# Patient Record
Sex: FEMALE | Race: WHITE | NOT HISPANIC OR LATINO | Employment: PART TIME | URBAN - METROPOLITAN AREA
[De-identification: names, ages, dates, MRNs, and addresses within clinical notes are randomized per-mention and may not be internally consistent; named-entity substitution may affect disease eponyms.]

---

## 2018-12-11 ENCOUNTER — HOSPITAL ENCOUNTER (EMERGENCY)
Facility: MEDICAL CENTER | Age: 63
End: 2018-12-11
Attending: EMERGENCY MEDICINE
Payer: COMMERCIAL

## 2018-12-11 ENCOUNTER — APPOINTMENT (OUTPATIENT)
Dept: RADIOLOGY | Facility: MEDICAL CENTER | Age: 63
End: 2018-12-11
Attending: EMERGENCY MEDICINE
Payer: COMMERCIAL

## 2018-12-11 VITALS
RESPIRATION RATE: 16 BRPM | DIASTOLIC BLOOD PRESSURE: 102 MMHG | BODY MASS INDEX: 28.12 KG/M2 | WEIGHT: 175 LBS | OXYGEN SATURATION: 98 % | HEIGHT: 66 IN | TEMPERATURE: 98.2 F | SYSTOLIC BLOOD PRESSURE: 169 MMHG | HEART RATE: 72 BPM

## 2018-12-11 DIAGNOSIS — S22.080A COMPRESSION FRACTURE OF T12 VERTEBRA (HCC): ICD-10-CM

## 2018-12-11 DIAGNOSIS — M54.6 ACUTE LEFT-SIDED THORACIC BACK PAIN: ICD-10-CM

## 2018-12-11 LAB
ALBUMIN SERPL BCP-MCNC: 4.3 G/DL (ref 3.2–4.9)
ALBUMIN/GLOB SERPL: 1.5 G/DL
ALP SERPL-CCNC: 87 U/L (ref 30–99)
ALT SERPL-CCNC: 17 U/L (ref 2–50)
ANION GAP SERPL CALC-SCNC: 10 MMOL/L (ref 0–11.9)
APPEARANCE UR: CLEAR
AST SERPL-CCNC: 18 U/L (ref 12–45)
BACTERIA #/AREA URNS HPF: ABNORMAL /HPF
BASOPHILS # BLD AUTO: 0.6 % (ref 0–1.8)
BASOPHILS # BLD: 0.05 K/UL (ref 0–0.12)
BILIRUB SERPL-MCNC: 0.6 MG/DL (ref 0.1–1.5)
BILIRUB UR QL STRIP.AUTO: NEGATIVE
BUN SERPL-MCNC: 13 MG/DL (ref 8–22)
CALCIUM SERPL-MCNC: 10.1 MG/DL (ref 8.5–10.5)
CHLORIDE SERPL-SCNC: 103 MMOL/L (ref 96–112)
CO2 SERPL-SCNC: 24 MMOL/L (ref 20–33)
COLOR UR: YELLOW
CREAT SERPL-MCNC: 0.52 MG/DL (ref 0.5–1.4)
EKG IMPRESSION: NORMAL
EOSINOPHIL # BLD AUTO: 0.07 K/UL (ref 0–0.51)
EOSINOPHIL NFR BLD: 0.8 % (ref 0–6.9)
EPI CELLS #/AREA URNS HPF: ABNORMAL /HPF
ERYTHROCYTE [DISTWIDTH] IN BLOOD BY AUTOMATED COUNT: 39.3 FL (ref 35.9–50)
GLOBULIN SER CALC-MCNC: 2.8 G/DL (ref 1.9–3.5)
GLUCOSE SERPL-MCNC: 87 MG/DL (ref 65–99)
GLUCOSE UR STRIP.AUTO-MCNC: NEGATIVE MG/DL
HCT VFR BLD AUTO: 45.9 % (ref 37–47)
HGB BLD-MCNC: 15.9 G/DL (ref 12–16)
HYALINE CASTS #/AREA URNS LPF: ABNORMAL /LPF
IMM GRANULOCYTES # BLD AUTO: 0.01 K/UL (ref 0–0.11)
IMM GRANULOCYTES NFR BLD AUTO: 0.1 % (ref 0–0.9)
KETONES UR STRIP.AUTO-MCNC: ABNORMAL MG/DL
LEUKOCYTE ESTERASE UR QL STRIP.AUTO: ABNORMAL
LIPASE SERPL-CCNC: 17 U/L (ref 11–82)
LYMPHOCYTES # BLD AUTO: 3.67 K/UL (ref 1–4.8)
LYMPHOCYTES NFR BLD: 44.5 % (ref 22–41)
MCH RBC QN AUTO: 31.3 PG (ref 27–33)
MCHC RBC AUTO-ENTMCNC: 34.6 G/DL (ref 33.6–35)
MCV RBC AUTO: 90.4 FL (ref 81.4–97.8)
MICRO URNS: ABNORMAL
MONOCYTES # BLD AUTO: 0.65 K/UL (ref 0–0.85)
MONOCYTES NFR BLD AUTO: 7.9 % (ref 0–13.4)
NEUTROPHILS # BLD AUTO: 3.8 K/UL (ref 2–7.15)
NEUTROPHILS NFR BLD: 46.1 % (ref 44–72)
NITRITE UR QL STRIP.AUTO: NEGATIVE
NRBC # BLD AUTO: 0 K/UL
NRBC BLD-RTO: 0 /100 WBC
PH UR STRIP.AUTO: 6 [PH]
PLATELET # BLD AUTO: 267 K/UL (ref 164–446)
PMV BLD AUTO: 9.8 FL (ref 9–12.9)
POTASSIUM SERPL-SCNC: 3.9 MMOL/L (ref 3.6–5.5)
PROT SERPL-MCNC: 7.1 G/DL (ref 6–8.2)
PROT UR QL STRIP: NEGATIVE MG/DL
RBC # BLD AUTO: 5.08 M/UL (ref 4.2–5.4)
RBC # URNS HPF: ABNORMAL /HPF
RBC UR QL AUTO: ABNORMAL
SODIUM SERPL-SCNC: 137 MMOL/L (ref 135–145)
SP GR UR STRIP.AUTO: 1.02
TROPONIN I SERPL-MCNC: <0.01 NG/ML (ref 0–0.04)
UROBILINOGEN UR STRIP.AUTO-MCNC: 0.2 MG/DL
WBC # BLD AUTO: 8.3 K/UL (ref 4.8–10.8)
WBC #/AREA URNS HPF: ABNORMAL /HPF

## 2018-12-11 PROCEDURE — 700111 HCHG RX REV CODE 636 W/ 250 OVERRIDE (IP): Performed by: EMERGENCY MEDICINE

## 2018-12-11 PROCEDURE — 84484 ASSAY OF TROPONIN QUANT: CPT

## 2018-12-11 PROCEDURE — 700102 HCHG RX REV CODE 250 W/ 637 OVERRIDE(OP): Performed by: EMERGENCY MEDICINE

## 2018-12-11 PROCEDURE — 72072 X-RAY EXAM THORAC SPINE 3VWS: CPT

## 2018-12-11 PROCEDURE — 80053 COMPREHEN METABOLIC PANEL: CPT

## 2018-12-11 PROCEDURE — 81001 URINALYSIS AUTO W/SCOPE: CPT

## 2018-12-11 PROCEDURE — 87086 URINE CULTURE/COLONY COUNT: CPT

## 2018-12-11 PROCEDURE — 94760 N-INVAS EAR/PLS OXIMETRY 1: CPT

## 2018-12-11 PROCEDURE — 71045 X-RAY EXAM CHEST 1 VIEW: CPT

## 2018-12-11 PROCEDURE — A9270 NON-COVERED ITEM OR SERVICE: HCPCS | Performed by: EMERGENCY MEDICINE

## 2018-12-11 PROCEDURE — 96375 TX/PRO/DX INJ NEW DRUG ADDON: CPT

## 2018-12-11 PROCEDURE — 99285 EMERGENCY DEPT VISIT HI MDM: CPT

## 2018-12-11 PROCEDURE — 93005 ELECTROCARDIOGRAM TRACING: CPT

## 2018-12-11 PROCEDURE — 96374 THER/PROPH/DIAG INJ IV PUSH: CPT

## 2018-12-11 PROCEDURE — 85025 COMPLETE CBC W/AUTO DIFF WBC: CPT

## 2018-12-11 PROCEDURE — 83690 ASSAY OF LIPASE: CPT

## 2018-12-11 RX ORDER — CYCLOBENZAPRINE HCL 10 MG
10 TABLET ORAL ONCE
Status: COMPLETED | OUTPATIENT
Start: 2018-12-11 | End: 2018-12-11

## 2018-12-11 RX ORDER — HYDROCODONE BITARTRATE AND ACETAMINOPHEN 10; 325 MG/1; MG/1
1 TABLET ORAL ONCE
Status: COMPLETED | OUTPATIENT
Start: 2018-12-11 | End: 2018-12-11

## 2018-12-11 RX ORDER — CYCLOBENZAPRINE HCL 10 MG
10 TABLET ORAL 3 TIMES DAILY PRN
Qty: 30 TAB | Refills: 0 | Status: SHIPPED | OUTPATIENT
Start: 2018-12-11 | End: 2024-01-12

## 2018-12-11 RX ORDER — ALPRAZOLAM 0.5 MG/1
0.5 TABLET ORAL 2 TIMES DAILY PRN
COMMUNITY

## 2018-12-11 RX ORDER — LIDOCAINE 50 MG/G
1 PATCH TOPICAL EVERY 24 HOURS
Qty: 10 PATCH | Refills: 0 | Status: SHIPPED | OUTPATIENT
Start: 2018-12-11 | End: 2024-01-12

## 2018-12-11 RX ORDER — KETOROLAC TROMETHAMINE 30 MG/ML
30 INJECTION, SOLUTION INTRAMUSCULAR; INTRAVENOUS ONCE
Status: COMPLETED | OUTPATIENT
Start: 2018-12-11 | End: 2018-12-11

## 2018-12-11 RX ADMIN — KETOROLAC TROMETHAMINE 30 MG: 30 INJECTION, SOLUTION INTRAMUSCULAR at 17:16

## 2018-12-11 RX ADMIN — LIDOCAINE HYDROCHLORIDE 30 ML: 20 SOLUTION OROPHARYNGEAL at 15:21

## 2018-12-11 RX ADMIN — HYDROCODONE BITARTRATE AND ACETAMINOPHEN 1 TABLET: 10; 325 TABLET ORAL at 17:16

## 2018-12-11 RX ADMIN — CYCLOBENZAPRINE 10 MG: 10 TABLET, FILM COATED ORAL at 17:16

## 2018-12-11 ASSESSMENT — PAIN SCALES - GENERAL
PAINLEVEL_OUTOF10: 10
PAINLEVEL_OUTOF10: 10

## 2018-12-11 NOTE — ED NOTES
"Pt states increased urinary urgency since seeing chiropractor, \"can't get to the bathroom quick enough\".   "

## 2018-12-11 NOTE — ED NOTES
"Pt with sudden on set of \"acid\" pain to (L) lower chest, under breast area. ERP at bedside,  PIV placed, labs drawn and sent.  EKG done,  Medicated per MAR with some relief.  Pt to be moved to [facility] when clean,  Pt updated on POC, denies other needs at this time.   "

## 2018-12-11 NOTE — ED PROVIDER NOTES
ED Provider Note    Scribed for Del Cole M.D. by Raheem Peacock. 12/11/2018  3:59 PM    Primary care provider: Yoel Mosher M.D.  Means of arrival: Wheel chair  History obtained from: Patient   History limited by: None     CHIEF COMPLAINT  Chief Complaint   Patient presents with   • Fall     reports slipping on ice while getting out of her jeep 2 weeks ago. diffuse back and LT sided rib pain ever since.    • Back Pain   • Rib Pain       HPI  Windy León is a 63 y.o. female who presents to the Emergency Department with back pain and rib pain secondary to a fall about 2 weeks ago. The patient states about two weeks ago she was trying to get out of her Jeep and the railing was icy and she slipped and slid out of her car. She states since then she has had back pain in her mid back down to her lower back. Patient has a long standing history of back pain and states that this fall worsened her pain. She states the pain feels the same as what is normally does but just much worse since the fall. She states she has tried going to her physical therapist and chiropractor for 2 weeks and was told she had some vertebrate and ribs out of place for this and it has not seemed to help. She also states she has been having some lower extremity weakness and is not able to walk without a bond or walker. Pt has been taking Ibuprofen and hydrocodone for her pain. She also notes a resolved episode of chest pain and states she has a history of heart burn which she takes omeprazole for and she states she gets about one episode daily. She gets these episodes daily and the patient also states she is a chronic smoker. Additionally she notes she has not had a bowel movement in a week but this is normal as she has a long history of constipation. She denies any numbness, tingling, loss of bowel or bladder, nausea, vomiting, fever, chills at this time.     REVIEW OF SYSTEMS  Pertinent positives include acute on chronic back pain and  "rib pain after fall, heart burn, weakness, constipation. Pertinent negatives include no numbness, tingling, loss of bowel or bladder, nausea, vomiting, fever, chills.  All other systems reviewed and negative.    PAST MEDICAL HISTORY   has a past medical history of Arthritis; Heart burn; and Psychiatric problem.    SURGICAL HISTORY   has a past surgical history that includes other cardiac surgery (2015); other orthopedic surgery; and hip arth anterior total (Left, 5/19/2016).    SOCIAL HISTORY  Social History   Substance Use Topics   • Smoking status: Current Every Day Smoker     Packs/day: 0.50     Types: Cigarettes   • Smokeless tobacco: Never Used   • Alcohol use No      History   Drug Use No       FAMILY HISTORY  None pertinent     CURRENT MEDICATIONS  Home Medications     Reviewed by Adrian Holt R.N. (Registered Nurse) on 12/11/18 at 1423  Med List Status: Partial   Medication Last Dose Status   ALPRAZolam (XANAX) 0.5 MG Tab 12/10/2018 Active   CALCIUM PO  Active   fluoxetine (PROZAC) 40 MG capsule 12/10/2018 Active   hydrocodone/acetaminophen (NORCO)  MG Tab 12/11/2018 Active   ibuprofen (MOTRIN) 600 MG Tab 12/11/2018 Active   MAGNESIUM PO  Active   omeprazole (PRILOSEC) 20 MG delayed-release capsule 12/10/2018 Active   therapeutic multivitamin-minerals (THERAGRAN-M) Tab 12/10/2018 Active                ALLERGIES  Allergies   Allergen Reactions   • Sulfa Drugs Itching       PHYSICAL EXAM  VITAL SIGNS: /85   Pulse 72   Temp 36.6 °C (97.9 °F) (Temporal)   Resp 14   Ht 1.676 m (5' 6\")   Wt 79.4 kg (175 lb)   SpO2 97%   BMI 28.25 kg/m²     Constitutional: Well developed, Well nourished, Moderate distress, Non-toxic appearance.   HENT: Normocephalic, Atraumatic, Bilateral external ears normal, Oropharynx moist, No oral exudates.   Eyes: PERRLA, EOMI, Conjunctiva normal, No discharge.   Neck: No tenderness, Supple, No stridor.   Lymphatic: No lymphadenopathy noted.   Cardiovascular: Normal heart " rate, Normal rhythm.   Thorax & Lungs: Chest wall tenderness. Clear to auscultation bilaterally, No respiratory distress, No wheezing, No crackles.   Back:Tenderness to palpation of the left lower thoracic paraspinal area. No midline thoracic or lumbar tenderness.   Abdomen: Soft, No tenderness, No masses, No pulsatile masses.   Skin: Warm, Dry, No erythema, No rash.   Extremities:, No edema No cyanosis.   Musculoskeletal: No tenderness to palpation or major deformities noted.  Intact distal pulses  Neurologic: Awake, alert. Moves all extremities spontaneously. 5/5 strength and sensation in bilateral upper and lower extremities.   Psychiatric: Affect normal, Judgment normal, Mood normal.     LABS  Results for orders placed or performed during the hospital encounter of 12/11/18   CBC WITH DIFFERENTIAL   Result Value Ref Range    WBC 8.3 4.8 - 10.8 K/uL    RBC 5.08 4.20 - 5.40 M/uL    Hemoglobin 15.9 12.0 - 16.0 g/dL    Hematocrit 45.9 37.0 - 47.0 %    MCV 90.4 81.4 - 97.8 fL    MCH 31.3 27.0 - 33.0 pg    MCHC 34.6 33.6 - 35.0 g/dL    RDW 39.3 35.9 - 50.0 fL    Platelet Count 267 164 - 446 K/uL    MPV 9.8 9.0 - 12.9 fL    Neutrophils-Polys 46.10 44.00 - 72.00 %    Lymphocytes 44.50 (H) 22.00 - 41.00 %    Monocytes 7.90 0.00 - 13.40 %    Eosinophils 0.80 0.00 - 6.90 %    Basophils 0.60 0.00 - 1.80 %    Immature Granulocytes 0.10 0.00 - 0.90 %    Nucleated RBC 0.00 /100 WBC    Neutrophils (Absolute) 3.80 2.00 - 7.15 K/uL    Lymphs (Absolute) 3.67 1.00 - 4.80 K/uL    Monos (Absolute) 0.65 0.00 - 0.85 K/uL    Eos (Absolute) 0.07 0.00 - 0.51 K/uL    Baso (Absolute) 0.05 0.00 - 0.12 K/uL    Immature Granulocytes (abs) 0.01 0.00 - 0.11 K/uL    NRBC (Absolute) 0.00 K/uL   COMP METABOLIC PANEL   Result Value Ref Range    Sodium 137 135 - 145 mmol/L    Potassium 3.9 3.6 - 5.5 mmol/L    Chloride 103 96 - 112 mmol/L    Co2 24 20 - 33 mmol/L    Anion Gap 10.0 0.0 - 11.9    Glucose 87 65 - 99 mg/dL    Bun 13 8 - 22 mg/dL     Creatinine 0.52 0.50 - 1.40 mg/dL    Calcium 10.1 8.5 - 10.5 mg/dL    AST(SGOT) 18 12 - 45 U/L    ALT(SGPT) 17 2 - 50 U/L    Alkaline Phosphatase 87 30 - 99 U/L    Total Bilirubin 0.6 0.1 - 1.5 mg/dL    Albumin 4.3 3.2 - 4.9 g/dL    Total Protein 7.1 6.0 - 8.2 g/dL    Globulin 2.8 1.9 - 3.5 g/dL    A-G Ratio 1.5 g/dL   LIPASE   Result Value Ref Range    Lipase 17 11 - 82 U/L   TROPONIN   Result Value Ref Range    Troponin I <0.01 0.00 - 0.04 ng/mL   URINALYSIS CULTURE, IF INDICATED   Result Value Ref Range    Color Yellow     Character Clear     Specific Gravity 1.018 <1.035    Ph 6.0 5.0 - 8.0    Glucose Negative Negative mg/dL    Ketones Trace (A) Negative mg/dL    Protein Negative Negative mg/dL    Bilirubin Negative Negative    Urobilinogen, Urine 0.2 Negative    Nitrite Negative Negative    Leukocyte Esterase Large (A) Negative    Occult Blood Small (A) Negative    Micro Urine Req Microscopic    ESTIMATED GFR   Result Value Ref Range    GFR If African American >60 >60 mL/min/1.73 m 2    GFR If Non African American >60 >60 mL/min/1.73 m 2   URINE MICROSCOPIC (W/UA)   Result Value Ref Range    WBC 10-20 (A) /hpf    RBC 10-20 (A) /hpf    Bacteria Few (A) None /hpf    Epithelial Cells Few /hpf    Hyaline Cast 3-5 (A) /lpf   EKG   Result Value Ref Range    Report       [facility] Emergency Dept.    Test Date:  2018  Pt Name:    LETY HERNANDEZ                  Department: ER  MRN:        [id number]                      Room:       [facility]  Gender:     Female                       Technician: [id number]  :        1955                   Requested By:ER TRIAGE PROTOCOL  Order #:    [id number]                    Reading MD: GILMA THOMPSON MD    Measurements  Intervals                                Axis  Rate:       64                           P:          80  AZ:         156                          QRS:        50  QRSD:       74                           T:          81  QT:         428  QTc:         442    Interpretive Statements  SINUS RHYTHM  PROBABLE LEFT ATRIAL ABNORMALITY  Compared to ECG 05/16/2016 11:57:42  No significant changes    Electronically Signed On 12- 15:48:24 PST by GILMA THOMPSON MD       All labs reviewed by me.    EKG  Interpreted by me as above    RADIOLOGY  DX-THORACIC SPINE-WITH SWIMMERS VIEW   Final Result      1.  Moderate superior endplate compression fracture at the T12 level of the thoracic spine is noted which could be due to insufficiency fracture or compression fracture of undetermined age.      2.  There is degenerative disc disease and dextroscoliosis.      DX-CHEST-PORTABLE (1 VIEW)   Final Result      No acute cardiopulmonary disease.        The radiologist's interpretation of all radiological studies have been reviewed by me.      COURSE & MEDICAL DECISION MAKING  Pertinent Labs & Imaging studies reviewed. (See chart for details)    3:59 PM - Patient seen and examined at bedside. Patient presents with acute on chronic back pain staring about two weeks ago after a fall as well as chronic heart burn and constipation. Patient will be treated with GI cocktail. Ordered chest x-ray, CBC, CMP, Lipase, Troponin, UA, and EKG to evaluate her symptoms. The differential diagnoses include but are not limited to: aacute on chronic low back pain, ACS, esophageal spasm. She was agreeable with her plan of care at this time.     Decision Making:  Patient with midthoracic back pain more pronounced on the left side, x-ray shows a compression fracture.  The patient has no improvement with Flexeril and Norco, offered to admit the patient to the hospital for symptomatically relief for possible assessment for kyphoplasty.  The patient does not want to be admitted to the hospital, will give the patient a prescription for Flexeril, lidocaine patches, have the patient follow-up with a primary care physician, have the patient return with worsening symptoms.     The patient will return  for new or worsening symptoms and is stable at the time of discharge.    The patient is referred to a primary physician for blood pressure management, diabetic screening, and for all other preventative health concerns.        DISPOSITION:  Patient will be discharged home in stable condition.    FOLLOW UP:  [facility], Emergency Dept  [address] [address] [phone]    If symptoms worsen    [facility]  [address] [phone]          OUTPATIENT MEDICATIONS:  Discharge Medication List as of 12/11/2018  6:29 PM      START taking these medications    Details   cyclobenzaprine (FLEXERIL) 10 MG Tab Take 1 Tab by mouth 3 times a day as needed., Disp-30 Tab, R-0, Normal      lidocaine (LIDODERM) 5 % Patch Apply 1 Patch to skin as directed every 24 hours., Disp-10 Patch, R-0, Normal      Diclofenac Sodium 1 % Gel Apply 2 g to skin as directed 3 times a day as needed., Disp-100 g, R-0, Normal               FINAL IMPRESSION  1. Acute left-sided thoracic back pain    2. Compression fracture of T12 vertebra (HCC)          Raheem BELTRE (Scribe), am scribing for, and in the presence of, Del Cole M.D..    Electronically signed by: Raheem Peacock (Riz[place]ana), 12/11/2018    IDel M.D. personally performed the services described in this documentation, as scribed by Raheem Peacock in my presence, and it is both accurate and complete. C.     The note accurately reflects work and decisions made by me.  Del Cole  12/11/2018  7:41 PM

## 2018-12-11 NOTE — ED TRIAGE NOTES
"Chief Complaint   Patient presents with   • Fall     reports slipping on ice while getting out of her jeep 2 weeks ago. diffuse back and LT sided rib pain ever since.    • Back Pain   • Rib Pain     Pt to triage via w/c for above. Reports going to a chiropractor and getting adjusted last Tues, Wed, Thurs (2 times a day). Reports he did an xray but couldn't read it.    Pt returned to [facility]. Educated on triage process and to inform staff of any changes.     /85   Pulse 72   Temp 36.6 °C (97.9 °F) (Temporal)   Resp 14   Ht 1.676 m (5' 6\")   Wt 79.4 kg (175 lb)   SpO2 97%   BMI 28.25 kg/m²     "

## 2018-12-13 LAB
BACTERIA UR CULT: NORMAL
SIGNIFICANT IND 70042: NORMAL
SITE SITE: NORMAL
SOURCE SOURCE: NORMAL

## 2021-02-02 ENCOUNTER — HOSPITAL ENCOUNTER (OUTPATIENT)
Dept: HOSPITAL 8 - CFH | Age: 66
Discharge: HOME | End: 2021-02-02
Attending: PHYSICIAN ASSISTANT
Payer: MEDICARE
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
## 2021-02-02 DIAGNOSIS — S22.008A: Primary | ICD-10-CM
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
## 2021-02-02 DIAGNOSIS — M81.0: ICD-10-CM

## 2021-02-02 PROCEDURE — 77080 DXA BONE DENSITY AXIAL: CPT

## 2021-03-03 DIAGNOSIS — Z23 NEED FOR VACCINATION: ICD-10-CM

## 2024-01-12 ENCOUNTER — APPOINTMENT (OUTPATIENT)
Dept: RADIOLOGY | Facility: MEDICAL CENTER | Age: 69
DRG: 481 | End: 2024-01-12
Attending: EMERGENCY MEDICINE
Payer: COMMERCIAL

## 2024-01-12 ENCOUNTER — ANESTHESIA (OUTPATIENT)
Dept: SURGERY | Facility: MEDICAL CENTER | Age: 69
DRG: 481 | End: 2024-01-12
Payer: COMMERCIAL

## 2024-01-12 ENCOUNTER — ANESTHESIA EVENT (OUTPATIENT)
Dept: SURGERY | Facility: MEDICAL CENTER | Age: 69
DRG: 481 | End: 2024-01-12
Payer: COMMERCIAL

## 2024-01-12 ENCOUNTER — APPOINTMENT (OUTPATIENT)
Dept: RADIOLOGY | Facility: MEDICAL CENTER | Age: 69
DRG: 481 | End: 2024-01-12
Attending: NEUROLOGICAL SURGERY
Payer: COMMERCIAL

## 2024-01-12 ENCOUNTER — APPOINTMENT (OUTPATIENT)
Dept: RADIOLOGY | Facility: MEDICAL CENTER | Age: 69
DRG: 481 | End: 2024-01-12
Attending: ORTHOPAEDIC SURGERY
Payer: COMMERCIAL

## 2024-01-12 ENCOUNTER — HOSPITAL ENCOUNTER (INPATIENT)
Facility: MEDICAL CENTER | Age: 69
LOS: 7 days | DRG: 481 | End: 2024-01-19
Attending: EMERGENCY MEDICINE | Admitting: HOSPITALIST
Payer: COMMERCIAL

## 2024-01-12 DIAGNOSIS — T14.8XXA FRACTURE: ICD-10-CM

## 2024-01-12 DIAGNOSIS — S72.422A CLOSED BICONDYLAR FRACTURE OF LEFT FEMUR, INITIAL ENCOUNTER (HCC): ICD-10-CM

## 2024-01-12 DIAGNOSIS — F17.200 TOBACCO DEPENDENCE: ICD-10-CM

## 2024-01-12 DIAGNOSIS — E87.6 HYPOKALEMIA: ICD-10-CM

## 2024-01-12 DIAGNOSIS — I16.0 HYPERTENSIVE URGENCY: ICD-10-CM

## 2024-01-12 DIAGNOSIS — S72.432A CLOSED BICONDYLAR FRACTURE OF LEFT FEMUR, INITIAL ENCOUNTER (HCC): ICD-10-CM

## 2024-01-12 PROBLEM — E86.0 DEHYDRATION: Status: ACTIVE | Noted: 2024-01-12

## 2024-01-12 PROBLEM — K21.9 GASTROESOPHAGEAL REFLUX DISEASE: Status: ACTIVE | Noted: 2024-01-12

## 2024-01-12 PROBLEM — F41.9 ANXIETY: Status: ACTIVE | Noted: 2024-01-12

## 2024-01-12 PROBLEM — E87.1 HYPONATREMIA: Status: ACTIVE | Noted: 2024-01-12

## 2024-01-12 LAB
ALBUMIN SERPL BCP-MCNC: 3.7 G/DL (ref 3.2–4.9)
ALBUMIN/GLOB SERPL: 1.5 G/DL
ALP SERPL-CCNC: 98 U/L (ref 30–99)
ALT SERPL-CCNC: 18 U/L (ref 2–50)
ANION GAP SERPL CALC-SCNC: 8 MMOL/L (ref 7–16)
AST SERPL-CCNC: 21 U/L (ref 12–45)
BASOPHILS # BLD AUTO: 0.7 % (ref 0–1.8)
BASOPHILS # BLD: 0.06 K/UL (ref 0–0.12)
BILIRUB SERPL-MCNC: <0.2 MG/DL (ref 0.1–1.5)
BUN SERPL-MCNC: 15 MG/DL (ref 8–22)
CALCIUM ALBUM COR SERPL-MCNC: 9.3 MG/DL (ref 8.5–10.5)
CALCIUM SERPL-MCNC: 9.1 MG/DL (ref 8.5–10.5)
CHLORIDE SERPL-SCNC: 99 MMOL/L (ref 96–112)
CO2 SERPL-SCNC: 27 MMOL/L (ref 20–33)
CREAT SERPL-MCNC: 0.65 MG/DL (ref 0.5–1.4)
EKG IMPRESSION: NORMAL
EOSINOPHIL # BLD AUTO: 0.07 K/UL (ref 0–0.51)
EOSINOPHIL NFR BLD: 0.8 % (ref 0–6.9)
ERYTHROCYTE [DISTWIDTH] IN BLOOD BY AUTOMATED COUNT: 43 FL (ref 35.9–50)
GFR SERPLBLD CREATININE-BSD FMLA CKD-EPI: 95 ML/MIN/1.73 M 2
GLOBULIN SER CALC-MCNC: 2.5 G/DL (ref 1.9–3.5)
GLUCOSE SERPL-MCNC: 100 MG/DL (ref 65–99)
HCT VFR BLD AUTO: 45.5 % (ref 37–47)
HGB BLD-MCNC: 15 G/DL (ref 12–16)
IMM GRANULOCYTES # BLD AUTO: 0.03 K/UL (ref 0–0.11)
IMM GRANULOCYTES NFR BLD AUTO: 0.3 % (ref 0–0.9)
LYMPHOCYTES # BLD AUTO: 3.58 K/UL (ref 1–4.8)
LYMPHOCYTES NFR BLD: 39.3 % (ref 22–41)
MCH RBC QN AUTO: 31.3 PG (ref 27–33)
MCHC RBC AUTO-ENTMCNC: 33 G/DL (ref 32.2–35.5)
MCV RBC AUTO: 95 FL (ref 81.4–97.8)
MONOCYTES # BLD AUTO: 0.78 K/UL (ref 0–0.85)
MONOCYTES NFR BLD AUTO: 8.6 % (ref 0–13.4)
NEUTROPHILS # BLD AUTO: 4.59 K/UL (ref 1.82–7.42)
NEUTROPHILS NFR BLD: 50.3 % (ref 44–72)
NRBC # BLD AUTO: 0 K/UL
NRBC BLD-RTO: 0 /100 WBC (ref 0–0.2)
PLATELET # BLD AUTO: 258 K/UL (ref 164–446)
PMV BLD AUTO: 10.2 FL (ref 9–12.9)
POTASSIUM SERPL-SCNC: 4.5 MMOL/L (ref 3.6–5.5)
PROT SERPL-MCNC: 6.2 G/DL (ref 6–8.2)
RBC # BLD AUTO: 4.79 M/UL (ref 4.2–5.4)
SODIUM SERPL-SCNC: 134 MMOL/L (ref 135–145)
WBC # BLD AUTO: 9.1 K/UL (ref 4.8–10.8)

## 2024-01-12 PROCEDURE — 72170 X-RAY EXAM OF PELVIS: CPT

## 2024-01-12 PROCEDURE — 99406 BEHAV CHNG SMOKING 3-10 MIN: CPT

## 2024-01-12 PROCEDURE — C1713 ANCHOR/SCREW BN/BN,TIS/BN: HCPCS | Performed by: ORTHOPAEDIC SURGERY

## 2024-01-12 PROCEDURE — 96374 THER/PROPH/DIAG INJ IV PUSH: CPT

## 2024-01-12 PROCEDURE — 73560 X-RAY EXAM OF KNEE 1 OR 2: CPT | Mod: LT

## 2024-01-12 PROCEDURE — 700111 HCHG RX REV CODE 636 W/ 250 OVERRIDE (IP): Performed by: ANESTHESIOLOGY

## 2024-01-12 PROCEDURE — 160048 HCHG OR STATISTICAL LEVEL 1-5: Performed by: ORTHOPAEDIC SURGERY

## 2024-01-12 PROCEDURE — 700101 HCHG RX REV CODE 250: Performed by: EMERGENCY MEDICINE

## 2024-01-12 PROCEDURE — 160009 HCHG ANES TIME/MIN: Performed by: ORTHOPAEDIC SURGERY

## 2024-01-12 PROCEDURE — 700111 HCHG RX REV CODE 636 W/ 250 OVERRIDE (IP): Performed by: EMERGENCY MEDICINE

## 2024-01-12 PROCEDURE — 700105 HCHG RX REV CODE 258: Performed by: ANESTHESIOLOGY

## 2024-01-12 PROCEDURE — 160035 HCHG PACU - 1ST 60 MINS PHASE I: Performed by: ORTHOPAEDIC SURGERY

## 2024-01-12 PROCEDURE — 700102 HCHG RX REV CODE 250 W/ 637 OVERRIDE(OP): Performed by: HOSPITALIST

## 2024-01-12 PROCEDURE — 502240 HCHG MISC OR SUPPLY RC 0272: Performed by: ORTHOPAEDIC SURGERY

## 2024-01-12 PROCEDURE — 700101 HCHG RX REV CODE 250: Performed by: STUDENT IN AN ORGANIZED HEALTH CARE EDUCATION/TRAINING PROGRAM

## 2024-01-12 PROCEDURE — 700102 HCHG RX REV CODE 250 W/ 637 OVERRIDE(OP): Performed by: ANESTHESIOLOGY

## 2024-01-12 PROCEDURE — 0QSC04Z REPOSITION LEFT LOWER FEMUR WITH INTERNAL FIXATION DEVICE, OPEN APPROACH: ICD-10-PCS | Performed by: ORTHOPAEDIC SURGERY

## 2024-01-12 PROCEDURE — 160039 HCHG SURGERY MINUTES - EA ADDL 1 MIN LEVEL 3: Performed by: ORTHOPAEDIC SURGERY

## 2024-01-12 PROCEDURE — 80053 COMPREHEN METABOLIC PANEL: CPT

## 2024-01-12 PROCEDURE — 36415 COLL VENOUS BLD VENIPUNCTURE: CPT

## 2024-01-12 PROCEDURE — 0QSCXZZ REPOSITION LEFT LOWER FEMUR, EXTERNAL APPROACH: ICD-10-PCS | Performed by: EMERGENCY MEDICINE

## 2024-01-12 PROCEDURE — A9270 NON-COVERED ITEM OR SERVICE: HCPCS | Performed by: HOSPITALIST

## 2024-01-12 PROCEDURE — 96376 TX/PRO/DX INJ SAME DRUG ADON: CPT

## 2024-01-12 PROCEDURE — 73552 X-RAY EXAM OF FEMUR 2/>: CPT | Mod: LT

## 2024-01-12 PROCEDURE — 700111 HCHG RX REV CODE 636 W/ 250 OVERRIDE (IP): Performed by: STUDENT IN AN ORGANIZED HEALTH CARE EDUCATION/TRAINING PROGRAM

## 2024-01-12 PROCEDURE — 502000 HCHG MISC OR IMPLANTS RC 0278: Performed by: ORTHOPAEDIC SURGERY

## 2024-01-12 PROCEDURE — 85025 COMPLETE CBC W/AUTO DIFF WBC: CPT

## 2024-01-12 PROCEDURE — 94799 UNLISTED PULMONARY SVC/PX: CPT

## 2024-01-12 PROCEDURE — A9270 NON-COVERED ITEM OR SERVICE: HCPCS | Performed by: ANESTHESIOLOGY

## 2024-01-12 PROCEDURE — 99285 EMERGENCY DEPT VISIT HI MDM: CPT

## 2024-01-12 PROCEDURE — 770001 HCHG ROOM/CARE - MED/SURG/GYN PRIV*

## 2024-01-12 PROCEDURE — 110371 HCHG SHELL REV 272: Performed by: ORTHOPAEDIC SURGERY

## 2024-01-12 PROCEDURE — 96375 TX/PRO/DX INJ NEW DRUG ADDON: CPT

## 2024-01-12 PROCEDURE — 700111 HCHG RX REV CODE 636 W/ 250 OVERRIDE (IP): Performed by: HOSPITALIST

## 2024-01-12 PROCEDURE — 700105 HCHG RX REV CODE 258: Performed by: HOSPITALIST

## 2024-01-12 PROCEDURE — 700101 HCHG RX REV CODE 250: Performed by: ANESTHESIOLOGY

## 2024-01-12 PROCEDURE — 73562 X-RAY EXAM OF KNEE 3: CPT | Mod: LT

## 2024-01-12 PROCEDURE — 27510 TREATMENT OF THIGH FRACTURE: CPT

## 2024-01-12 PROCEDURE — 73700 CT LOWER EXTREMITY W/O DYE: CPT | Mod: LT

## 2024-01-12 PROCEDURE — 93010 ELECTROCARDIOGRAM REPORT: CPT | Performed by: HOSPITALIST

## 2024-01-12 PROCEDURE — 160028 HCHG SURGERY MINUTES - 1ST 30 MINS LEVEL 3: Performed by: ORTHOPAEDIC SURGERY

## 2024-01-12 PROCEDURE — 93005 ELECTROCARDIOGRAM TRACING: CPT | Performed by: HOSPITALIST

## 2024-01-12 PROCEDURE — 160002 HCHG RECOVERY MINUTES (STAT): Performed by: ORTHOPAEDIC SURGERY

## 2024-01-12 DEVICE — IMPLANTABLE DEVICE: Type: IMPLANTABLE DEVICE | Site: FEMUR | Status: FUNCTIONAL

## 2024-01-12 DEVICE — 5MM LOCKING SCREW 5.0MM  L80MM: Type: IMPLANTABLE DEVICE | Site: FEMUR | Status: FUNCTIONAL

## 2024-01-12 DEVICE — 5MM LOCKING SCREW 5.0MM  L65MM: Type: IMPLANTABLE DEVICE | Site: FEMUR | Status: FUNCTIONAL

## 2024-01-12 DEVICE — 4.5MM CORTEX TI SCREW 4.5MM  L38MM: Type: IMPLANTABLE DEVICE | Site: FEMUR | Status: FUNCTIONAL

## 2024-01-12 DEVICE — 5MM LOCKING SCREW 5.0MM  L75MM: Type: IMPLANTABLE DEVICE | Site: FEMUR | Status: FUNCTIONAL

## 2024-01-12 DEVICE — 4.5MM CORTEX TI SCREW 4.5MM  L32MM: Type: IMPLANTABLE DEVICE | Site: FEMUR | Status: FUNCTIONAL

## 2024-01-12 DEVICE — 5MM LOCKING SCREW 5.0MM  L70MM: Type: IMPLANTABLE DEVICE | Site: FEMUR | Status: FUNCTIONAL

## 2024-01-12 DEVICE — WIRE FIXATION KIRSCHNER TROCAR POINT: Type: IMPLANTABLE DEVICE | Site: FEMUR | Status: FUNCTIONAL

## 2024-01-12 RX ORDER — CYCLOBENZAPRINE HCL 10 MG
5 TABLET ORAL 3 TIMES DAILY PRN
Status: DISCONTINUED | OUTPATIENT
Start: 2024-01-12 | End: 2024-01-19 | Stop reason: HOSPADM

## 2024-01-12 RX ORDER — DEXAMETHASONE SODIUM PHOSPHATE 4 MG/ML
INJECTION, SOLUTION INTRA-ARTICULAR; INTRALESIONAL; INTRAMUSCULAR; INTRAVENOUS; SOFT TISSUE PRN
Status: DISCONTINUED | OUTPATIENT
Start: 2024-01-12 | End: 2024-01-12 | Stop reason: SURG

## 2024-01-12 RX ORDER — ALPRAZOLAM 0.5 MG/1
0.5 TABLET ORAL 2 TIMES DAILY PRN
Status: DISCONTINUED | OUTPATIENT
Start: 2024-01-12 | End: 2024-01-19 | Stop reason: HOSPADM

## 2024-01-12 RX ORDER — LABETALOL HYDROCHLORIDE 5 MG/ML
10 INJECTION, SOLUTION INTRAVENOUS EVERY 4 HOURS PRN
Status: DISCONTINUED | OUTPATIENT
Start: 2024-01-12 | End: 2024-01-19 | Stop reason: HOSPADM

## 2024-01-12 RX ORDER — OXYCODONE HYDROCHLORIDE 5 MG/1
5 TABLET ORAL
Status: DISCONTINUED | OUTPATIENT
Start: 2024-01-12 | End: 2024-01-19 | Stop reason: HOSPADM

## 2024-01-12 RX ORDER — ONDANSETRON 2 MG/ML
4 INJECTION INTRAMUSCULAR; INTRAVENOUS
Status: DISCONTINUED | OUTPATIENT
Start: 2024-01-12 | End: 2024-01-12 | Stop reason: HOSPADM

## 2024-01-12 RX ORDER — CHONDROITIN/COLLAGEN/HYALURON 500 MG
1 CAPSULE ORAL EVERY MORNING
COMMUNITY

## 2024-01-12 RX ORDER — ROCURONIUM BROMIDE 10 MG/ML
INJECTION, SOLUTION INTRAVENOUS PRN
Status: DISCONTINUED | OUTPATIENT
Start: 2024-01-12 | End: 2024-01-12 | Stop reason: SURG

## 2024-01-12 RX ORDER — CEFAZOLIN SODIUM 1 G/3ML
INJECTION, POWDER, FOR SOLUTION INTRAMUSCULAR; INTRAVENOUS PRN
Status: DISCONTINUED | OUTPATIENT
Start: 2024-01-12 | End: 2024-01-12 | Stop reason: SURG

## 2024-01-12 RX ORDER — HYDROMORPHONE HYDROCHLORIDE 1 MG/ML
0.5 INJECTION, SOLUTION INTRAMUSCULAR; INTRAVENOUS; SUBCUTANEOUS
Status: DISCONTINUED | OUTPATIENT
Start: 2024-01-12 | End: 2024-01-19 | Stop reason: HOSPADM

## 2024-01-12 RX ORDER — ACETAMINOPHEN 325 MG/1
650 TABLET ORAL EVERY 6 HOURS PRN
Status: DISCONTINUED | OUTPATIENT
Start: 2024-01-12 | End: 2024-01-19 | Stop reason: HOSPADM

## 2024-01-12 RX ORDER — KETOROLAC TROMETHAMINE 30 MG/ML
15 INJECTION, SOLUTION INTRAMUSCULAR; INTRAVENOUS EVERY 6 HOURS PRN
Status: DISPENSED | OUTPATIENT
Start: 2024-01-12 | End: 2024-01-13

## 2024-01-12 RX ORDER — ONDANSETRON 4 MG/1
4 TABLET, ORALLY DISINTEGRATING ORAL EVERY 4 HOURS PRN
Status: DISCONTINUED | OUTPATIENT
Start: 2024-01-12 | End: 2024-01-19 | Stop reason: HOSPADM

## 2024-01-12 RX ORDER — OXYCODONE HCL 5 MG/5 ML
10 SOLUTION, ORAL ORAL
Status: COMPLETED | OUTPATIENT
Start: 2024-01-12 | End: 2024-01-12

## 2024-01-12 RX ORDER — HYDROMORPHONE HYDROCHLORIDE 1 MG/ML
1 INJECTION, SOLUTION INTRAMUSCULAR; INTRAVENOUS; SUBCUTANEOUS ONCE
Status: COMPLETED | OUTPATIENT
Start: 2024-01-12 | End: 2024-01-12

## 2024-01-12 RX ORDER — ALBUTEROL SULFATE 90 UG/1
1-2 AEROSOL, METERED RESPIRATORY (INHALATION) EVERY 4 HOURS PRN
COMMUNITY
Start: 2023-12-15

## 2024-01-12 RX ORDER — HYDRALAZINE HYDROCHLORIDE 20 MG/ML
5 INJECTION INTRAMUSCULAR; INTRAVENOUS
Status: DISCONTINUED | OUTPATIENT
Start: 2024-01-12 | End: 2024-01-12 | Stop reason: HOSPADM

## 2024-01-12 RX ORDER — AMOXICILLIN 250 MG
2 CAPSULE ORAL 2 TIMES DAILY
Status: DISCONTINUED | OUTPATIENT
Start: 2024-01-12 | End: 2024-01-19

## 2024-01-12 RX ORDER — SUCCINYLCHOLINE CHLORIDE 20 MG/ML
INJECTION INTRAMUSCULAR; INTRAVENOUS PRN
Status: DISCONTINUED | OUTPATIENT
Start: 2024-01-12 | End: 2024-01-12 | Stop reason: SURG

## 2024-01-12 RX ORDER — NICOTINE 21 MG/24HR
14 PATCH, TRANSDERMAL 24 HOURS TRANSDERMAL
Status: DISCONTINUED | OUTPATIENT
Start: 2024-01-12 | End: 2024-01-19 | Stop reason: HOSPADM

## 2024-01-12 RX ORDER — HYDROMORPHONE HYDROCHLORIDE 1 MG/ML
0.5 INJECTION, SOLUTION INTRAMUSCULAR; INTRAVENOUS; SUBCUTANEOUS ONCE
Status: COMPLETED | OUTPATIENT
Start: 2024-01-12 | End: 2024-01-12

## 2024-01-12 RX ORDER — HYDROMORPHONE HYDROCHLORIDE 2 MG/ML
INJECTION, SOLUTION INTRAMUSCULAR; INTRAVENOUS; SUBCUTANEOUS PRN
Status: DISCONTINUED | OUTPATIENT
Start: 2024-01-12 | End: 2024-01-12 | Stop reason: SURG

## 2024-01-12 RX ORDER — MIDAZOLAM HYDROCHLORIDE 1 MG/ML
INJECTION INTRAMUSCULAR; INTRAVENOUS PRN
Status: DISCONTINUED | OUTPATIENT
Start: 2024-01-12 | End: 2024-01-12 | Stop reason: SURG

## 2024-01-12 RX ORDER — HYDROMORPHONE HYDROCHLORIDE 1 MG/ML
0.25 INJECTION, SOLUTION INTRAMUSCULAR; INTRAVENOUS; SUBCUTANEOUS
Status: DISCONTINUED | OUTPATIENT
Start: 2024-01-12 | End: 2024-01-12

## 2024-01-12 RX ORDER — BUPIVACAINE HYDROCHLORIDE 2.5 MG/ML
10 INJECTION, SOLUTION EPIDURAL; INFILTRATION; INTRACAUDAL ONCE
Status: DISCONTINUED | OUTPATIENT
Start: 2024-01-12 | End: 2024-01-12

## 2024-01-12 RX ORDER — HALOPERIDOL 5 MG/ML
1 INJECTION INTRAMUSCULAR
Status: DISCONTINUED | OUTPATIENT
Start: 2024-01-12 | End: 2024-01-12 | Stop reason: HOSPADM

## 2024-01-12 RX ORDER — OXYCODONE HYDROCHLORIDE 5 MG/1
2.5 TABLET ORAL
Status: DISCONTINUED | OUTPATIENT
Start: 2024-01-12 | End: 2024-01-12

## 2024-01-12 RX ORDER — LIDOCAINE HYDROCHLORIDE 20 MG/ML
INJECTION, SOLUTION EPIDURAL; INFILTRATION; INTRACAUDAL; PERINEURAL PRN
Status: DISCONTINUED | OUTPATIENT
Start: 2024-01-12 | End: 2024-01-12 | Stop reason: SURG

## 2024-01-12 RX ORDER — EPHEDRINE SULFATE 50 MG/ML
5 INJECTION, SOLUTION INTRAVENOUS
Status: DISCONTINUED | OUTPATIENT
Start: 2024-01-12 | End: 2024-01-12 | Stop reason: HOSPADM

## 2024-01-12 RX ORDER — BISACODYL 10 MG
10 SUPPOSITORY, RECTAL RECTAL
Status: DISCONTINUED | OUTPATIENT
Start: 2024-01-12 | End: 2024-01-19

## 2024-01-12 RX ORDER — SODIUM CHLORIDE 9 MG/ML
INJECTION, SOLUTION INTRAVENOUS CONTINUOUS
Status: DISCONTINUED | OUTPATIENT
Start: 2024-01-12 | End: 2024-01-19 | Stop reason: HOSPADM

## 2024-01-12 RX ORDER — OXYCODONE HYDROCHLORIDE 5 MG/1
5 TABLET ORAL
Status: DISCONTINUED | OUTPATIENT
Start: 2024-01-12 | End: 2024-01-12

## 2024-01-12 RX ORDER — SODIUM CHLORIDE, SODIUM LACTATE, POTASSIUM CHLORIDE, CALCIUM CHLORIDE 600; 310; 30; 20 MG/100ML; MG/100ML; MG/100ML; MG/100ML
INJECTION, SOLUTION INTRAVENOUS
Status: DISCONTINUED | OUTPATIENT
Start: 2024-01-12 | End: 2024-01-12 | Stop reason: SURG

## 2024-01-12 RX ORDER — HYDROMORPHONE HYDROCHLORIDE 1 MG/ML
0.4 INJECTION, SOLUTION INTRAMUSCULAR; INTRAVENOUS; SUBCUTANEOUS
Status: DISCONTINUED | OUTPATIENT
Start: 2024-01-12 | End: 2024-01-12 | Stop reason: HOSPADM

## 2024-01-12 RX ORDER — FLUOXETINE HYDROCHLORIDE 20 MG/1
40 CAPSULE ORAL 2 TIMES DAILY
Status: DISCONTINUED | OUTPATIENT
Start: 2024-01-12 | End: 2024-01-19 | Stop reason: HOSPADM

## 2024-01-12 RX ORDER — ESMOLOL HYDROCHLORIDE 10 MG/ML
INJECTION INTRAVENOUS PRN
Status: DISCONTINUED | OUTPATIENT
Start: 2024-01-12 | End: 2024-01-12 | Stop reason: SURG

## 2024-01-12 RX ORDER — OMEPRAZOLE 20 MG/1
40 CAPSULE, DELAYED RELEASE ORAL 2 TIMES DAILY
Status: DISCONTINUED | OUTPATIENT
Start: 2024-01-12 | End: 2024-01-19 | Stop reason: HOSPADM

## 2024-01-12 RX ORDER — ACETAMINOPHEN 10 MG/ML
INJECTION, SOLUTION INTRAVENOUS PRN
Status: DISCONTINUED | OUTPATIENT
Start: 2024-01-12 | End: 2024-01-12 | Stop reason: SURG

## 2024-01-12 RX ORDER — HYDROMORPHONE HYDROCHLORIDE 1 MG/ML
0.2 INJECTION, SOLUTION INTRAMUSCULAR; INTRAVENOUS; SUBCUTANEOUS
Status: DISCONTINUED | OUTPATIENT
Start: 2024-01-12 | End: 2024-01-12 | Stop reason: HOSPADM

## 2024-01-12 RX ORDER — LABETALOL HYDROCHLORIDE 5 MG/ML
5 INJECTION, SOLUTION INTRAVENOUS
Status: DISCONTINUED | OUTPATIENT
Start: 2024-01-12 | End: 2024-01-12 | Stop reason: HOSPADM

## 2024-01-12 RX ORDER — HYDRALAZINE HYDROCHLORIDE 20 MG/ML
10 INJECTION INTRAMUSCULAR; INTRAVENOUS EVERY 4 HOURS PRN
Status: DISCONTINUED | OUTPATIENT
Start: 2024-01-12 | End: 2024-01-19 | Stop reason: HOSPADM

## 2024-01-12 RX ORDER — DIPHENHYDRAMINE HYDROCHLORIDE 50 MG/ML
12.5 INJECTION INTRAMUSCULAR; INTRAVENOUS
Status: DISCONTINUED | OUTPATIENT
Start: 2024-01-12 | End: 2024-01-12 | Stop reason: HOSPADM

## 2024-01-12 RX ORDER — METOCLOPRAMIDE HYDROCHLORIDE 5 MG/ML
INJECTION INTRAMUSCULAR; INTRAVENOUS PRN
Status: DISCONTINUED | OUTPATIENT
Start: 2024-01-12 | End: 2024-01-12 | Stop reason: SURG

## 2024-01-12 RX ORDER — ALBUTEROL SULFATE 90 UG/1
1-2 AEROSOL, METERED RESPIRATORY (INHALATION)
Status: DISCONTINUED | OUTPATIENT
Start: 2024-01-12 | End: 2024-01-19 | Stop reason: HOSPADM

## 2024-01-12 RX ORDER — HYDROMORPHONE HYDROCHLORIDE 1 MG/ML
0.1 INJECTION, SOLUTION INTRAMUSCULAR; INTRAVENOUS; SUBCUTANEOUS
Status: DISCONTINUED | OUTPATIENT
Start: 2024-01-12 | End: 2024-01-12 | Stop reason: HOSPADM

## 2024-01-12 RX ORDER — OXYCODONE HCL 5 MG/5 ML
5 SOLUTION, ORAL ORAL
Status: COMPLETED | OUTPATIENT
Start: 2024-01-12 | End: 2024-01-12

## 2024-01-12 RX ORDER — POLYETHYLENE GLYCOL 3350 17 G/17G
1 POWDER, FOR SOLUTION ORAL
Status: DISCONTINUED | OUTPATIENT
Start: 2024-01-12 | End: 2024-01-19

## 2024-01-12 RX ORDER — CYCLOBENZAPRINE HCL 5 MG
5 TABLET ORAL 3 TIMES DAILY PRN
COMMUNITY

## 2024-01-12 RX ORDER — ONDANSETRON 2 MG/ML
4 INJECTION INTRAMUSCULAR; INTRAVENOUS EVERY 4 HOURS PRN
Status: DISCONTINUED | OUTPATIENT
Start: 2024-01-12 | End: 2024-01-19 | Stop reason: HOSPADM

## 2024-01-12 RX ORDER — ACETAMINOPHEN 10 MG/ML
INJECTION, SOLUTION INTRAVENOUS
Status: COMPLETED
Start: 2024-01-12 | End: 2024-01-12

## 2024-01-12 RX ORDER — OXYCODONE HYDROCHLORIDE 5 MG/1
2.5 TABLET ORAL
Status: DISCONTINUED | OUTPATIENT
Start: 2024-01-12 | End: 2024-01-19 | Stop reason: HOSPADM

## 2024-01-12 RX ORDER — LABETALOL HYDROCHLORIDE 5 MG/ML
INJECTION, SOLUTION INTRAVENOUS PRN
Status: DISCONTINUED | OUTPATIENT
Start: 2024-01-12 | End: 2024-01-12 | Stop reason: SURG

## 2024-01-12 RX ORDER — HYDROMORPHONE HYDROCHLORIDE 1 MG/ML
1 INJECTION, SOLUTION INTRAMUSCULAR; INTRAVENOUS; SUBCUTANEOUS
Status: DISCONTINUED | OUTPATIENT
Start: 2024-01-12 | End: 2024-01-12

## 2024-01-12 RX ORDER — SODIUM CHLORIDE, SODIUM LACTATE, POTASSIUM CHLORIDE, CALCIUM CHLORIDE 600; 310; 30; 20 MG/100ML; MG/100ML; MG/100ML; MG/100ML
INJECTION, SOLUTION INTRAVENOUS CONTINUOUS
Status: DISCONTINUED | OUTPATIENT
Start: 2024-01-12 | End: 2024-01-12 | Stop reason: HOSPADM

## 2024-01-12 RX ADMIN — SODIUM CHLORIDE: 9 INJECTION, SOLUTION INTRAVENOUS at 17:38

## 2024-01-12 RX ADMIN — HYDRALAZINE HYDROCHLORIDE 10 MG: 20 INJECTION, SOLUTION INTRAMUSCULAR; INTRAVENOUS at 14:06

## 2024-01-12 RX ADMIN — KETOROLAC TROMETHAMINE 15 MG: 30 INJECTION, SOLUTION INTRAMUSCULAR; INTRAVENOUS at 23:26

## 2024-01-12 RX ADMIN — HYDROMORPHONE HYDROCHLORIDE 0.25 MG: 1 INJECTION, SOLUTION INTRAMUSCULAR; INTRAVENOUS; SUBCUTANEOUS at 12:05

## 2024-01-12 RX ADMIN — HYDROMORPHONE HYDROCHLORIDE 0.5 MG: 1 INJECTION, SOLUTION INTRAMUSCULAR; INTRAVENOUS; SUBCUTANEOUS at 17:36

## 2024-01-12 RX ADMIN — ALBUTEROL SULFATE 2.5 MG: 2.5 SOLUTION RESPIRATORY (INHALATION) at 21:16

## 2024-01-12 RX ADMIN — FENTANYL CITRATE 25 MCG: 50 INJECTION, SOLUTION INTRAMUSCULAR; INTRAVENOUS at 21:15

## 2024-01-12 RX ADMIN — FENTANYL CITRATE 50 MCG: 50 INJECTION, SOLUTION INTRAMUSCULAR; INTRAVENOUS at 19:25

## 2024-01-12 RX ADMIN — ACETAMINOPHEN 1000 MG: 1000 INJECTION INTRAVENOUS at 19:45

## 2024-01-12 RX ADMIN — SUGAMMADEX 200 MG: 100 INJECTION, SOLUTION INTRAVENOUS at 20:33

## 2024-01-12 RX ADMIN — PROPOFOL 30 MG: 10 INJECTION, EMULSION INTRAVENOUS at 19:36

## 2024-01-12 RX ADMIN — CEFAZOLIN 2 G: 1 INJECTION, POWDER, FOR SOLUTION INTRAMUSCULAR; INTRAVENOUS at 19:31

## 2024-01-12 RX ADMIN — SUCCINYLCHOLINE CHLORIDE 80 MG: 20 INJECTION, SOLUTION INTRAMUSCULAR; INTRAVENOUS at 19:26

## 2024-01-12 RX ADMIN — LABETALOL HYDROCHLORIDE 5 MG: 5 INJECTION, SOLUTION INTRAVENOUS at 20:02

## 2024-01-12 RX ADMIN — LABETALOL HYDROCHLORIDE 5 MG: 5 INJECTION INTRAVENOUS at 21:25

## 2024-01-12 RX ADMIN — NICOTINE 14 MG: 14 PATCH TRANSDERMAL at 13:47

## 2024-01-12 RX ADMIN — ESMOLOL HYDROCHLORIDE 30 MG: 100 INJECTION, SOLUTION INTRAVENOUS at 19:33

## 2024-01-12 RX ADMIN — ROCURONIUM BROMIDE 10 MG: 50 INJECTION, SOLUTION INTRAVENOUS at 19:26

## 2024-01-12 RX ADMIN — SODIUM CHLORIDE, POTASSIUM CHLORIDE, SODIUM LACTATE AND CALCIUM CHLORIDE: 600; 310; 30; 20 INJECTION, SOLUTION INTRAVENOUS at 19:22

## 2024-01-12 RX ADMIN — ESMOLOL HYDROCHLORIDE 30 MG: 100 INJECTION, SOLUTION INTRAVENOUS at 19:55

## 2024-01-12 RX ADMIN — MIDAZOLAM HYDROCHLORIDE 1 MG: 1 INJECTION, SOLUTION INTRAMUSCULAR; INTRAVENOUS at 19:23

## 2024-01-12 RX ADMIN — LIDOCAINE HYDROCHLORIDE 100 MG: 20 INJECTION, SOLUTION EPIDURAL; INFILTRATION; INTRACAUDAL at 19:26

## 2024-01-12 RX ADMIN — HYDROMORPHONE HYDROCHLORIDE 0.4 MG: 2 INJECTION INTRAMUSCULAR; INTRAVENOUS; SUBCUTANEOUS at 19:55

## 2024-01-12 RX ADMIN — ROCURONIUM BROMIDE 20 MG: 50 INJECTION, SOLUTION INTRAVENOUS at 20:10

## 2024-01-12 RX ADMIN — KETAMINE HYDROCHLORIDE 75 MG: 50 INJECTION INTRAMUSCULAR; INTRAVENOUS at 11:07

## 2024-01-12 RX ADMIN — HYDROMORPHONE HYDROCHLORIDE 0.2 MG: 2 INJECTION INTRAMUSCULAR; INTRAVENOUS; SUBCUTANEOUS at 20:34

## 2024-01-12 RX ADMIN — HYDROMORPHONE HYDROCHLORIDE 0.25 MG: 1 INJECTION, SOLUTION INTRAMUSCULAR; INTRAVENOUS; SUBCUTANEOUS at 15:31

## 2024-01-12 RX ADMIN — FENTANYL CITRATE 50 MCG: 50 INJECTION, SOLUTION INTRAMUSCULAR; INTRAVENOUS at 19:36

## 2024-01-12 RX ADMIN — PROPOFOL 150 MG: 10 INJECTION, EMULSION INTRAVENOUS at 19:26

## 2024-01-12 RX ADMIN — ROCURONIUM BROMIDE 20 MG: 50 INJECTION, SOLUTION INTRAVENOUS at 19:31

## 2024-01-12 RX ADMIN — METOCLOPRAMIDE 10 MG: 5 INJECTION, SOLUTION INTRAMUSCULAR; INTRAVENOUS at 19:26

## 2024-01-12 RX ADMIN — OXYCODONE HYDROCHLORIDE 10 MG: 5 SOLUTION ORAL at 21:15

## 2024-01-12 RX ADMIN — HYDROMORPHONE HYDROCHLORIDE 1 MG: 1 INJECTION, SOLUTION INTRAMUSCULAR; INTRAVENOUS; SUBCUTANEOUS at 09:28

## 2024-01-12 RX ADMIN — DEXAMETHASONE SODIUM PHOSPHATE 4 MG: 4 INJECTION INTRA-ARTICULAR; INTRALESIONAL; INTRAMUSCULAR; INTRAVENOUS; SOFT TISSUE at 19:26

## 2024-01-12 ASSESSMENT — ENCOUNTER SYMPTOMS
COUGH: 0
NERVOUS/ANXIOUS: 0
ABDOMINAL PAIN: 0
FLANK PAIN: 0
EYE DISCHARGE: 0
CHILLS: 0
SHORTNESS OF BREATH: 0
FOCAL WEAKNESS: 0
VOMITING: 0
FEVER: 0
MYALGIAS: 0
BRUISES/BLEEDS EASILY: 0
FALLS: 1
EYE REDNESS: 0
STRIDOR: 0

## 2024-01-12 ASSESSMENT — PAIN DESCRIPTION - PAIN TYPE
TYPE: SURGICAL PAIN
TYPE: ACUTE PAIN
TYPE: ACUTE PAIN
TYPE: SURGICAL PAIN
TYPE: ACUTE PAIN

## 2024-01-12 ASSESSMENT — PAIN SCALES - GENERAL: PAIN_LEVEL: 2

## 2024-01-12 NOTE — PROGRESS NOTES
1310  Pt arrived to unit via [employer], slide board to bed. A&O x 4, pain 10/10, VSS on 2L O2, immobilizer to LLE in place, with all belongings at bedside. Pt oriented to unit, call light and belongings within reach, educated to call for assistance.    4 Eyes Skin Assessment Completed by LUCY Blackburn and LUCY Willett.    Head WDL  Ears WDL  Nose WDL  Mouth WDL  Neck WDL  Breast/Chest WDL  Shoulder Blades WDL  Spine WDL  (R) Arm/Elbow/Hand Scratch  (L) Arm/Elbow/Hand WDL  Abdomen WDL  Groin WDL  Scrotum/Coccyx/Buttocks WDL  (R) Leg WDL  (L) Leg Swelling, immobilizer in place  (R) Heel/Foot/Toe Dry  (L) Heel/Foot/Toe Dry          Devices In Places Blood Pressure Cuff, Pulse Ox, SCD's, Leg Immobilizer, and Nasal Cannula      Interventions In Place NC W/Ear Foams, Waffle Overlay, TAP System, Pillows, Dri-Zak Pads, Heels Loaded W/Pillows, and Pressure Redistribution Mattress    Possible Skin Injury No    Pictures Uploaded Into Epic N/A  Wound Consult Placed N/A  RN Wound Prevention Protocol Ordered No

## 2024-01-12 NOTE — ASSESSMENT & PLAN NOTE
"The patient has an elevated BMI of 29.  She is at higher risk for postoperative hypoxia. I will order continuous pulse oximetry  Emphasized incentive spirometry, consider BiPAP/CPAP postprocedure  \"    Recommended weight loss advised, 5% through reduced calorie, low carb diet and 150 mins of exercise a week once better  Recommend bariatric surgery evaluation if morbidly obese  Educated on the increase of morbidity and mortality associated with excess weight including DM, Heart Disease, HTN, stroke, and sleep apnea. Recommended outpatient monitoring  of blood sugars, lipid panel, sleep study evaluation for metabolic syndrome.      "

## 2024-01-12 NOTE — CARE PLAN
The patient is Stable - Low risk of patient condition declining or worsening    Shift Goals  Clinical Goals: pain control, surgery today 1/12  Patient Goals: pain control    Progress made toward(s) clinical / shift goals:    Problem: Pain - Standard  Goal: Alleviation of pain or a reduction in pain to the patient’s comfort goal  Outcome: Progressing  Note: Pt states pain is relieved with current pain medication regimen. Pt medicated per MAR with + results. Pt provided ice packs and pillows for comfort.        Problem: Skin Integrity  Goal: Skin integrity is maintained or improved  Outcome: Progressing  Note: Waffle overlay in place, TAPs, frequent incontinence checks.

## 2024-01-12 NOTE — LETTER
"  FORM C-4:  EMPLOYEE’S CLAIM FOR COMPENSATION/ REPORT OF INITIAL TREATMENT  EMPLOYEE’S CLAIM - PROVIDE ALL INFORMATION REQUESTED   First Name Windy Last Name Mau Birthdate 1955  Sex female Claim Number   Home Address [address]   [facility] [address]             Zip [address]                                   Age  68 y.o. Height  1.6 m (5' 2.99\") Weight  75.8 kg (167 lb) [facility]  [id number]   Mailing Address [address]  [facility] [address]              Zip [address] Telephone  [phone] (home)  Primary Language Spoken   Insurer  *** Third Party   [employer] Employee's Occupation (Job Title) When Injury or Occupational Disease Occurred     Employer's Name [employer] Telephone [phone]    Employer Address [address] [facility] [address] [[address]] Zip [address]   Date of Injury  1/12/2024       Hour of Injury  7:45 AM Date Employer Notified  1/12/2024 Last Day of Work after Injury or Occupational Disease  1/12/2024 Supervisor to Whom Injury Reported  Nidia   Address or Location of Accident (if applicable) Work [1]   What were you doing at the time of accident? (if applicable) Parked my car    How did this injury or occupational disease occur? Be specific and answer in detail. Use additional sheet if necessary)  I parked my car in the parking lot. Walked across the lot, there was a big pile of ice about 5 inches tall. I looked for a way to go around it, and there was no way around it, so I tried to step over it and my left heel caught on the ice and flipped me upside down.   If you believe that you have an occupational disease, when did you first have knowledge of the disability and it relationship to your employment? N/A Witnesses to the Accident  Yadi   Nature of Injury or Occupational Disease  Workers' Compensation Part(s) of Body Injured or Affected  Upper Leg (L), N/A, N/A    I CERTIFY THAT THE ABOVE IS TRUE AND CORRECT TO THE BEST OF MY KNOWLEDGE AND THAT I HAVE " PROVIDED THIS INFORMATION IN ORDER TO OBTAIN THE BENEFITS OF [place]).  I HEREBY AUTHORIZE ANY PHYSICIAN, CHIROPRACTOR, SURGEON, PRACTITIONER, OR OTHER PERSON, ANY HOSPITAL, INCLUDING [facility], ANY MEDICAL SERVICE ORGANIZATION, ANY INSURANCE COMPANY, OR OTHER INSTITUTION OR ORGANIZATION TO RELEASE TO EACH OTHER, ANY MEDICAL OR OTHER INFORMATION, INCLUDING BENEFITS PAID OR PAYABLE, PERTINENT TO THIS INJURY OR DISEASE, EXCEPT INFORMATION RELATIVE TO DIAGNOSIS, TREATMENT AND/OR COUNSELING FOR AIDS, PSYCHOLOGICAL CONDITIONS, ALCOHOL OR CONTROLLED SUBSTANCES, FOR WHICH I MUST GIVE SPECIFIC AUTHORIZATION.  A PHOTOSTAT OF THIS AUTHORIZATION SHALL BE AS VALID AS THE ORIGINAL.  Date                                      Place                                                                             Employee’s Signature   THIS REPORT MUST BE COMPLETED AND MAILED WITHIN 3 WORKING DAYS OF TREATMENT   Place DISCHARGE LOUNGE                       Name of [facility]   Date  1/12/2024 Diagnosis  (S72.422A,  S72.432A) Closed bicondylar fracture of left femur, initial encounter ([facility])  (T14.8XXA) Severely comminuted fracture of the left distal femur  (I16.0) Hypertensive urgency  (F17.200) Tobacco dependence  (E87.6) Hypokalemia Is there evidence the injured employee was under the influence of alcohol and/or another controlled substance at the time of accident?   Hour  10:35 AM Description of Injury or Disease  Closed bicondylar fracture of left femur, initial encounter ([facility])  Fracture  Hypertensive urgency  Tobacco dependence  Hypokalemia     Treatment     Have you advised the patient to remain off work five days or more?             X-Ray Findings    If Yes   From Date    To Date      From information given by the employee, together with medical evidence, can you  "directly connect this injury or occupational disease as job incurred?   If No, is employee capable of: Full Duty    Modified Duty      Is additional medical care by a physician indicated?   If Modified Duty, Specify any Limitations / Restrictions       Do you know of any previous injury or disease contributing to this condition or occupational disease?      Date 2/2/2024 Print Doctor’s Name Kb Garcia certify the employer’s copy of this form was mailed on:   Address [address] [place] [address]  [phone] INSURER’S USE ONLY   Provider’s Tax ID Number   Telephone Dept: [phone]    Doctor’s Signature   Degree        Form C-4 (rev.10/07)                                                                         BRIEF DESCRIPTION OF RIGHTS AND BENEFITS  (Pursuant to [place] 616C.050)    Notice of Injury or Occupational Disease (Incident Report Form C-1): If an injury or occupational disease (OD) arises out of and in the course of employment, you must provide written notice to your employer as soon as practicable, but no later than 7 days after the accident or OD. Your employer shall maintain a sufficient supply of the required forms.    Claim for Compensation (Form C-4): If medical treatment is sought, the form C-4 is available at the place of initial treatment. A completed \"Claim for Compensation\" (Form C-4) must be filed within 90 days after an accident or OD. The treating physician or chiropractor must, within 3 working days after treatment, complete and mail to the employer, the employer's insurer and third-party , the Claim for Compensation.    Medical Treatment: If you require medical treatment for your on-the-job injury or OD, you may be required to select a physician or chiropractor from a list provided by your workers’ compensation insurer, if it has contracted with an Organization for Managed Care (MCO) or Preferred Provider Organization (PPO) or providers of health care. If your " employer has not entered into a contract with an MCO or PPO, you may select a physician or chiropractor from the Panel of Physicians and Chiropractors. Any medical costs related to your industrial injury or OD will be paid by your insurer.    Temporary Total Disability (TTD): If your doctor has certified that you are unable to work for a period of at least 5 consecutive days, or 5 cumulative days in a 20-day period, or places restrictions on you that your employer does not accommodate, you may be entitled to TTD compensation.    Temporary Partial Disability (TPD): If the wage you receive upon reemployment is less than the compensation for TTD to which you are entitled, the insurer may be required to pay you TPD compensation to make up the difference. TPD can only be paid for a maximum of 24 months.    Permanent Partial Disability (PPD): When your medical condition is stable and there is an indication of a PPD as a result of your injury or OD, within 30 days, your insurer must arrange for an evaluation by a rating physician or chiropractor to determine the degree of your PPD. The amount of your PPD award depends on the date of injury, the results of the PPD evaluation, your age and wage.    Permanent Total Disability (PTD): If you are medically certified by a treating physician or chiropractor as permanently and totally disabled and have been granted a PTD status by your insurer, you are entitled to receive monthly benefits not to exceed 66 2/3% of your average monthly wage. The amount of your PTD payments is subject to reduction if you previously received a lump-sum PPD award.    Vocational Rehabilitation Services: You may be eligible for vocational rehabilitation services if you are unable to return to the job due to a permanent physical impairment or permanent restrictions as a result of your injury or occupational disease.    Transportation and Per Carrol Reimbursement: You may be eligible for travel expenses and  per rafa associated with medical treatment.    Reopening: You may be able to reopen your claim if your condition worsens after claim closure.     Appeal Process: If you disagree with a written determination issued by the insurer or the insurer does not respond to your request, you may appeal to the Department of Administration, , by following the instructions contained in your determination letter. You must appeal the determination within 70 days from the date of the determination letter at [address], [address], [place], [place] [address], or [address], [address], [place], [place] [address]. If you disagree with the  decision, you may appeal to the Department of Administration, . You must file your appeal within 30 days from the date of the  decision letter at [address], [address], [place], [place] [address], or [address], [place], [place] [address]. If you disagree with a decision of an , you may file a petition for judicial review with the District Court. You must do so within 30 days of the Appeal Officer’s decision. You may be represented by an  at your own expense or you may contact the [facility] for possible representation.    [place]  for Injured Workers (NAIW): If you disagree with a  decision, you may request that NAIW represent you without charge at an  Hearing. For information regarding denial of benefits, you may contact the [facility] at: [address], [address], [place] [address], [phone], or [address],  [address], [phone]    To File a Complaint with the Division: If you wish to file a complaint with the  of the Division of Industrial Relations (DIR),  please contact the Workers’ Compensation Section, [address], [address], [place], [place] [address], telephone  [phone], or [address], [address], [place], [place] [address], telephone [phone].    For assistance with Workers’ Compensation Issues: You may contact the [facility] Office for Consumer Health Assistance, [address], [address], [place] [address], Toll Free [phone], Web site: http://[facility].[place].gov/Programs/CHAR E-mail: char@[facility].nv.gov  D-2 (rev. 10/20)              __________________________________________________________________                                    _________________            Employee Name / Signature                                                                                                                            Date

## 2024-01-12 NOTE — ED PROVIDER NOTES
ED Provider Note    CHIEF COMPLAINT  Chief Complaint   Patient presents with    GLF     MGLF on ice today, denies hitting head / LOC    Leg Injury     LLE swelling / pain in L thigh s/p GLF today, hx osteoporosis and L hip replacement, +CMS distal to injury           HPI/ROS      Windy León is a 68 y.o. female who presents after mechanical ground-level fall.  Patient reports she fell onto her left leg. Pt was unable to ambulate afterwards 2/2 pain. Pt denies any associated head trauma. She denies any use of anticoagulants. Pt denies any SHANKAR, or neck pain. She denies any n/v or weakness or numbness. Pt reports significant knee and hip pain on the left.    PAST MEDICAL HISTORY   has a past medical history of Arthritis, Heart burn, and Psychiatric problem.    SURGICAL HISTORY   has a past surgical history that includes other cardiac surgery (2015); other orthopedic surgery; and hip arth anterior total (Left, 5/19/2016).    FAMILY HISTORY  No family history on file.    SOCIAL HISTORY  Social History     Tobacco Use    Smoking status: Every Day     Current packs/day: 0.50     Types: Cigarettes    Smokeless tobacco: Never   Substance and Sexual Activity    Alcohol use: No    Drug use: No    Sexual activity: Not on file       CURRENT MEDICATIONS  Home Medications       Reviewed by Bruno Groves R.N. (Registered Nurse) on 01/12/24 at 0915  Med List Status: Not Addressed     Medication Last Dose Status   ALPRAZolam (XANAX) 0.5 MG Tab  Active   CALCIUM PO  Active   cyclobenzaprine (FLEXERIL) 10 MG Tab  Active   Diclofenac Sodium 1 % Gel  Active   fluoxetine (PROZAC) 40 MG capsule  Active   hydrocodone/acetaminophen (NORCO)  MG Tab  Active   ibuprofen (MOTRIN) 600 MG Tab  Active   lidocaine (LIDODERM) 5 % Patch  Active   MAGNESIUM PO  Active   omeprazole (PRILOSEC) 20 MG delayed-release capsule  Active   therapeutic multivitamin-minerals (THERAGRAN-M) Tab  Active                    ALLERGIES  Allergies   Allergen  "Reactions    Sulfa Drugs Itching       PHYSICAL EXAM  VITAL SIGNS: BP (!) 204/95   Pulse 87   Temp 36.3 °C (97.4 °F) (Temporal)   Resp 14   Ht 1.6 m (5' 3\")   Wt 75.8 kg (167 lb)   SpO2 96%   BMI 29.58 kg/m²    Physical Exam  Constitutional:       Appearance: Normal appearance.   HENT:      Head: Normocephalic and atraumatic.      Comments: No abrasions or contusions     Mouth/Throat:      Mouth: Mucous membranes are moist.   Pulmonary:      Effort: Pulmonary effort is normal.   Musculoskeletal:      Comments: Bony cripitus of L mid femoral shaft. Unable to range hip or knee 2/2 pain. Distal pulses are 2+. No associated ankle ttp or foot pain or bony abn  C/T/L clear of any ttp   Neurological:      General: No focal deficit present.      Mental Status: She is alert and oriented to person, place, and time.   Psychiatric:         Mood and Affect: Mood normal.           DIAGNOSTIC STUDIES / PROCEDURES      LABS  Results for orders placed or performed during the hospital encounter of 01/12/24   CBC WITH DIFFERENTIAL   Result Value Ref Range    WBC 9.1 4.8 - 10.8 K/uL    RBC 4.79 4.20 - 5.40 M/uL    Hemoglobin 15.0 12.0 - 16.0 g/dL    Hematocrit 45.5 37.0 - 47.0 %    MCV 95.0 81.4 - 97.8 fL    MCH 31.3 27.0 - 33.0 pg    MCHC 33.0 32.2 - 35.5 g/dL    RDW 43.0 35.9 - 50.0 fL    Platelet Count 258 164 - 446 K/uL    MPV 10.2 9.0 - 12.9 fL    Neutrophils-Polys 50.30 44.00 - 72.00 %    Lymphocytes 39.30 22.00 - 41.00 %    Monocytes 8.60 0.00 - 13.40 %    Eosinophils 0.80 0.00 - 6.90 %    Basophils 0.70 0.00 - 1.80 %    Immature Granulocytes 0.30 0.00 - 0.90 %    Nucleated RBC 0.00 0.00 - 0.20 /100 WBC    Neutrophils (Absolute) 4.59 1.82 - 7.42 K/uL    Lymphs (Absolute) 3.58 1.00 - 4.80 K/uL    Monos (Absolute) 0.78 0.00 - 0.85 K/uL    Eos (Absolute) 0.07 0.00 - 0.51 K/uL    Baso (Absolute) 0.06 0.00 - 0.12 K/uL    Immature Granulocytes (abs) 0.03 0.00 - 0.11 K/uL    NRBC (Absolute) 0.00 K/uL   Comp Metabolic Panel "   Result Value Ref Range    Sodium 134 (L) 135 - 145 mmol/L    Potassium 4.5 3.6 - 5.5 mmol/L    Chloride 99 96 - 112 mmol/L    Co2 27 20 - 33 mmol/L    Anion Gap 8.0 7.0 - 16.0    Glucose 100 (H) 65 - 99 mg/dL    Bun 15 8 - 22 mg/dL    Creatinine 0.65 0.50 - 1.40 mg/dL    Calcium 9.1 8.5 - 10.5 mg/dL    Correct Calcium 9.3 8.5 - 10.5 mg/dL    AST(SGOT) 21 12 - 45 U/L    ALT(SGPT) 18 2 - 50 U/L    Alkaline Phosphatase 98 30 - 99 U/L    Total Bilirubin <0.2 0.1 - 1.5 mg/dL    Albumin 3.7 3.2 - 4.9 g/dL    Total Protein 6.2 6.0 - 8.2 g/dL    Globulin 2.5 1.9 - 3.5 g/dL    A-G Ratio 1.5 g/dL   ESTIMATED GFR   Result Value Ref Range    GFR (CKD-EPI) 95 >60 mL/min/1.73 m 2   EKG   Result Value Ref Range    Report       [facility]    Test Date:  2024  Pt Name:    LETY HERNANDEZ                  Department: ER  MRN:        [id number]                      Room:       [facility]  Gender:     Female                       Technician: [id number]  :        1955                   Requested By:CLARICE SHARMA  Order #:    [id number]                    Reading MD:    Measurements  Intervals                                Axis  Rate:       93                           P:          74  NM:         168                          QRS:        49  QRSD:       90                           T:          66  QT:         384  QTc:        478    Interpretive Statements  Atrial-paced complexes  Right atrial enlargement  Artifact in lead(s) I,II,aVR,aVL,aVF  Compared to ECG 2018 15:24:54  Sinus rhythm no longer present           RADIOLOGY  I have independently interpreted the diagnostic imaging associated with this visit and am waiting the final reading from the radiologist.   My preliminary interpretation is as follows: Comminuted distal femur fracture  Radiologist interpretation:   DX-KNEE 3 VIEWS LEFT   Final Result      1.  Comminuted and impacted fracture of the distal left femur without evidence of  articular extension. Persistent posterior angulation post reduction.   2.  Osteopenia.      DX-KNEE 3 VIEWS LEFT   Final Result      1.  Partially visualized, comminuted fracture of the distal left femur without evidence of articular extension.   2.  Osteopenia.      DX-FEMUR-2+ LEFT   Final Result      1.  Severely comminuted fracture of the distal femur. Recommend knee radiographs to more confidently exclude extension to the knee joint.   2.  Prior LEFT hip arthroplasty   3.  Osteopenia   4.  Osteoarthritis      DX-PELVIS-1 OR 2 VIEWS   Final Result      1.  Deformity of the RIGHT pubic bone compatible with age indeterminate trauma. This could be acute or chronic fracture. Comparison with any prior images would be helpful.   2.  Prior LEFT hip arthroplasty   3.  Osteopenia   4.  Osteoarthritis      CT-KNEE W/O PLUS RECONS LEFT    (Results Pending)   DX-KNEE 3 VIEWS LEFT    (Results Pending)   DX-PORTABLE FLUORO > 1 HOUR    (Results Pending)         COURSE & MEDICAL DECISION MAKING    Procedural sedation  Patient consented for procedural sedation, risk benefits and alternatives were discussed  Timeout was performed  Patient was placed on pulse oximetry, telemetry, and CO2 capnography.  Respiratory therapy and nursing at bedside.  Patient was given 1mg/kg ketamine  Approximately 15 minutes of procedural sedation were completed  Patient tolerated well without any episodes of hypercapnia, hypoxia or apnea    Joint reduction  Patient consented for joint reduction  Risk benefits and alternatives were discussed  Timeout was performed  Once patient adequately sedated traction and countertraction were applied to patient's L leg and knee  This resulted in improved anatomical alignment  Patient placed in knee imob  X-ray revealed interval improvement  Patient was neurovascularly intact following      INITIAL ASSESSMENT, COURSE AND PLAN  Care Narrative: Patient here with likely femur fracture.  Patient is neurovascular  intact.  Patient appears very well otherwise.  Patient without any associated head trauma, no evidence of head trauma, therefore CT head, CT neck deferred.  Patient has not intoxicated.  Patient x-ray of her femur reveals distal femur fracture.  Will add knee films.  Patient case discussed with orthopedics.  They would like patient placed in knee immobilizer.  Given patient's significant pain and inability to tolerate placing a knee immobilizer she was sedated, and I also attempted to reduce the fracture as much as possible for better alignment.  Following this patient tolerated well, pain was significantly improved.  Patient case was discussed with hospitalist who will admit.  Orthopedics to likely take to the OR later today.        DISPOSITION AND DISCUSSIONS  I have discussed management of the patient with the following physicians and ADONIS's: Orthopedics, hospitalist      Escalation of care considered, and ultimately not performed: CT head, CT spine deferred, see above for reasoning.      FINAL DIAGNOSIS  1. Closed bicondylar fracture of left femur, initial encounter (formerly [facility])

## 2024-01-12 NOTE — RESPIRATORY CARE
Conscious Sedation Respiratory Update       O2 (LPM): 4 (01/12/24 1115)       Events/Summary/Plan: present for conscious sedation. ETCO2 12-30 t/o procedure. no complications. (01/12/24 1115)

## 2024-01-12 NOTE — ED NOTES
Pharmacy Medication Reconciliation      ~Medication reconciliation updated and complete per patient at bedside   ~Allergies have been verified and updated   ~No oral ABX within the last 30 days  ~Patient home pharmacy :  [facility]      ~Anticoagulants (rivaroxaban, apixaban, edoxaban, dabigatran, warfarin, enoxaparin) taken in the last 14 days? No

## 2024-01-12 NOTE — ED TRIAGE NOTES
"Chief Complaint   Patient presents with    GLF     MGLF on ice today, denies hitting head / LOC    Leg Injury     LLE swelling / pain in L thigh s/p GLF today, hx osteoporosis and L hip replacement, +CMS distal to injury     Pt BIB [facility] for above complaints, VSS on 2L NC s/p EMS pain meds, GCS 15, pt in obvious discomfort.    Pt received 100mcg fentanyl and 2mg versed en route.    BP (!) 204/95   Pulse 87   Temp 36.3 °C (97.4 °F) (Temporal)   Resp 14   Ht 1.6 m (5' 3\")   Wt 75.8 kg (167 lb)   SpO2 96%   BMI 29.58 kg/m²     "

## 2024-01-12 NOTE — ED NOTES
Pt returns to baseline s/p conscious sedation to align L femur and place knee immobilizer, VSS on RA, GCS 15, NAD, pt aware POC to admit and surgery, pt remains NPO

## 2024-01-12 NOTE — H&P
Hospital Medicine History & Physical Note    Date of Service  1/12/2024    Primary Care Physician  Yoel Mosher M.D.    Consultants  Geraldine, Dr Amezquita     Code Status  Full Code    Chief Complaint  Chief Complaint   Patient presents with    GLF     MGLF on ice today, denies hitting head / LOC    Leg Injury     LLE swelling / pain in L thigh s/p GLF today, hx osteoporosis and L hip replacement, +CMS distal to injury     History of Presenting Illness  Windy León is a 68 y.o. female with a past medical history of anxiety, tobacco dependence and a BMI of 29 who presented 1/12/2024 with left hip pain after a fall.  Patient reports that the pain is severe rated as 10/10.  The pain is worse with attempting to move or bear weight on the left lower extremity.  The patient reports having a mechanical fall landing on the left side.  She denies hitting her head or losing consciousness..    I discussed the plan of care with emergency department physician, the patient    Review of Systems  Review of Systems   Constitutional:  Negative for chills and fever.   Eyes:  Negative for discharge and redness.   Respiratory:  Negative for cough, shortness of breath and stridor.    Cardiovascular:  Negative for chest pain and leg swelling.   Gastrointestinal:  Negative for abdominal pain and vomiting.   Genitourinary:  Negative for flank pain.   Musculoskeletal:  Positive for falls and joint pain. Negative for myalgias.   Skin: Negative.    Neurological:  Negative for focal weakness.   Endo/Heme/Allergies:  Does not bruise/bleed easily.   Psychiatric/Behavioral:  The patient is not nervous/anxious.      Past Medical History   has a past medical history of Arthritis, Heart burn, and Psychiatric problem.    Surgical History   has a past surgical history that includes other cardiac surgery (2015); other orthopedic surgery; and hip arth anterior total (Left, 5/19/2016).     Family History  family history is not on file.      Social  History   reports that she has been smoking cigarettes. She has never used smokeless tobacco. She reports that she does not drink alcohol and does not use drugs.    Allergies  Allergies   Allergen Reactions    Sulfa Drugs Itching and Nausea     Medications  Prior to Admission Medications   Prescriptions Last Dose Informant Patient Reported? Taking?   ALPRAZolam (XANAX) 0.5 MG Tab   Yes No   Sig: Take 0.5 mg by mouth at bedtime as needed for Sleep.   CALCIUM PO  Patient Yes No   Sig: Take 2 Tabs by mouth every day.   Diclofenac Sodium 1 % Gel   No No   Sig: Apply 2 g to skin as directed 3 times a day as needed.   MAGNESIUM PO  Patient Yes No   Sig: Take 2 Tabs by mouth every day.   cyclobenzaprine (FLEXERIL) 10 MG Tab   No No   Sig: Take 1 Tab by mouth 3 times a day as needed.   fluoxetine (PROZAC) 40 MG capsule  Patient Yes No   Sig: Take 40 mg by mouth every day.   hydrocodone/acetaminophen (NORCO)  MG Tab   No No   Sig: Take 1-2 Tabs by mouth every 6 hours as needed for Severe Pain. Indications: Moderate to Moderately Severe Pain   ibuprofen (MOTRIN) 600 MG Tab   Yes No   Sig: Take 600 mg by mouth every day.   lidocaine (LIDODERM) 5 % Patch   No No   Sig: Apply 1 Patch to skin as directed every 24 hours.   omeprazole (PRILOSEC) 20 MG delayed-release capsule  Patient Yes No   Sig: Take 20 mg by mouth every day.   therapeutic multivitamin-minerals (THERAGRAN-M) Tab  Patient Yes No   Sig: Take 1 Tab by mouth every day.      Facility-Administered Medications: None     Physical Exam  Temp:  [36.2 °C (97.2 °F)-36.4 °C (97.6 °F)] 36.4 °C (97.5 °F)  Pulse:  [] 96  Resp:  [14-20] 16  BP: (132-225)/() 132/83  SpO2:  [93 %-98 %] 94 %  Blood Pressure : (!) 204/95   Temperature: 36.3 °C (97.4 °F)   Pulse: 87   Respiration: 14   Pulse Oximetry: 96 %     Physical Exam  Constitutional:       General: She is not in acute distress.  HENT:      Head: Normocephalic and atraumatic.      Right Ear: External ear  "normal.      Left Ear: External ear normal.      Nose: No congestion or rhinorrhea.      Mouth/Throat:      Mouth: Mucous membranes are moist.      Pharynx: No oropharyngeal exudate or posterior oropharyngeal erythema.   Eyes:      General: No scleral icterus.        Right eye: No discharge.         Left eye: No discharge.      Conjunctiva/sclera: Conjunctivae normal.      Pupils: Pupils are equal, round, and reactive to light.   Cardiovascular:      Heart sounds:      No friction rub. No gallop.   Pulmonary:      Effort: Pulmonary effort is normal.   Abdominal:      General: Abdomen is flat. There is no distension.      Tenderness: There is no guarding.   Musculoskeletal:         General: No swelling.      Cervical back: Neck supple. No rigidity. No muscular tenderness.      Right lower leg: No edema.      Left lower leg: No edema.   Skin:     Capillary Refill: Capillary refill takes 2 to 3 seconds.      Coloration: Skin is not jaundiced or pale.      Findings: No bruising or erythema.   Neurological:      Mental Status: She is alert and oriented to person, place, and time.   Psychiatric:         Mood and Affect: Mood normal.         Judgment: Judgment normal.       Laboratory:  Recent Labs     01/12/24  0905   WBC 9.1   RBC 4.79   HEMOGLOBIN 15.0   HEMATOCRIT 45.5   MCV 95.0   MCH 31.3   MCHC 33.0   RDW 43.0   PLATELETCT 258   MPV 10.2     Recent Labs     01/12/24  0905   SODIUM 134*   POTASSIUM 4.5   CHLORIDE 99   CO2 27   GLUCOSE 100*   BUN 15   CREATININE 0.65   CALCIUM 9.1     Recent Labs     01/12/24  0905   ALTSGPT 18   ASTSGOT 21   ALKPHOSPHAT 98   TBILIRUBIN <0.2   GLUCOSE 100*         No results for input(s): \"NTPROBNP\" in the last 72 hours.      No results for input(s): \"TROPONINT\" in the last 72 hours.    Imaging:  CT-KNEE W/O PLUS RECONS LEFT   Final Result      Comminuted distal femur fracture. No definite extension to the articular surface.      DX-KNEE 3 VIEWS LEFT   Final Result      1.  Comminuted " and impacted fracture of the distal left femur without evidence of articular extension. Persistent posterior angulation post reduction.   2.  Osteopenia.      DX-KNEE 3 VIEWS LEFT   Final Result      1.  Partially visualized, comminuted fracture of the distal left femur without evidence of articular extension.   2.  Osteopenia.      DX-FEMUR-2+ LEFT   Final Result      1.  Severely comminuted fracture of the distal femur. Recommend knee radiographs to more confidently exclude extension to the knee joint.   2.  Prior LEFT hip arthroplasty   3.  Osteopenia   4.  Osteoarthritis      DX-PELVIS-1 OR 2 VIEWS   Final Result      1.  Deformity of the RIGHT pubic bone compatible with age indeterminate trauma. This could be acute or chronic fracture. Comparison with any prior images would be helpful.   2.  Prior LEFT hip arthroplasty   3.  Osteopenia   4.  Osteoarthritis      DX-KNEE 3 VIEWS LEFT    (Results Pending)   DX-PORTABLE FLUORO > 1 HOUR    (Results Pending)     I patient reviewed patient EKG, it shows atrial paced complexes with a rate of 93, there is no ST elevation.  There is no ST depression.  QTc is 478.     Assessment/Plan:  Justification for Admission Status  I anticipate this patient will require at least two midnights for appropriate medical management, necessitating inpatient admission because patient has femoral fracture.    Patient will need a Med/Surg bed on ORTHOPEDICS service .  The need is secondary to the patient has a femoral fracture..    * Severely comminuted fracture of the left distal femur- (present on admission)  Assessment & Plan  Orthopedics consulted, anticipate need for operative treatment    Multimodal pain control  Consider physical and Occupational Therapy, pharmacologic prophylaxis when okay with orthopedics   Revised Cardiac Risk Index for Pre-Operative Risk score of:   2 points Class III Risk  The patient has a 10.1 % 30-day risk of death, MI, or cardiac arrest   The patient has an  elevated BMI of 29.  She is at higher risk for postoperative hypoxia. I will order continuous pulse oximetry  Emphasized incentive spirometry, consider BiPAP/CPAP postprocedure      Hypertensive urgency- (present on admission)  Assessment & Plan  Likely secondary to severe pain  I will place on multimodal pain medications   I will start as needed labetalol for extreme hypertension  Consider starting scheduled antihypertensive medications according to blood pressure trend    Dehydration- (present on admission)  Assessment & Plan  Likely secondary to reduced oral intake.   Encourage oral intake as tolerated, antiemetics as needed.  Intravenous hydration until adequate oral intake is achieved.     Hyponatremia- (present on admission)  Assessment & Plan  Hypovolemic hyponatremia  I expect Na to improve with IVF     BMI 29.0-29.9,adult- (present on admission)  Assessment & Plan  The patient has an elevated BMI of 29.  She is at higher risk for postoperative hypoxia. I will order continuous pulse oximetry  Emphasized incentive spirometry, consider BiPAP/CPAP postprocedure      Tobacco dependence- (present on admission)  Assessment & Plan  I spent 4 minutes on Tobacco cessation education and counseling.   I Discussed the benefits of quitting smoking and risks of continued smoking including cardiovascular disease, cancer and COPD.   Discussed the option of nicotine patch.     Anxiety- (present on admission)  Assessment & Plan  Resume fluoxetine   As needed alprazolam      VTE prophylaxis: SCDs/TEDs

## 2024-01-12 NOTE — LETTER
"  FORM C-4:  EMPLOYEE’S CLAIM FOR COMPENSATION/ REPORT OF INITIAL TREATMENT  EMPLOYEE’S CLAIM - PROVIDE ALL INFORMATION REQUESTED   First Name Windy Last Name Mau Birthdate 1955  Sex female Claim Number   Home Address [address]   [facility] [place]             Zip [address]                                   Age  68 y.o. Height  1.6 m (5' 2.99\") Weight  75.8 kg (167 lb) [facility]     Mailing Address [address]  [facility] [place]              Zip [address] Telephone  [phone] (home)  Primary Language Spoken  ENGLISH   Insurer   Third Party    Employee's Occupation (Job Title) When Injury or Occupational Disease Occurred     Employer's Name [employer] Telephone [phone]    Employer Address [address] [facility] [[address]] Zip [address]   Date of Injury  1/12/2024       Hour of Injury  7:45 AM Date Employer Notified  1/12/2024 Last Day of Work after Injury or Occupational Disease  1/12/2024 Supervisor to Whom Injury Reported  Nidia   Address or Location of Accident (if applicable) Work [1]   What were you doing at the time of accident? (if applicable) Parked my car    How did this injury or occupational disease occur? Be specific and answer in detail. Use additional sheet if necessary)  I parked my car in the parking lot. Walked across the lot, there was a big pile of ice about 5 inches tall. I looked for a way to go around it, and there was no way around it, so I tried to step over it and my left heel caught on the ice and flipped me upside down.   If you believe that you have an occupational disease, when did you first have knowledge of the disability and it relationship to your employment? N/A Witnesses to the Accident  Yadi   Nature of Injury or Occupational Disease  Workers' Compensation Part(s) of Body Injured or Affected  Upper Leg (L), N/A, N/A    I CERTIFY THAT THE ABOVE IS TRUE AND CORRECT TO THE BEST OF MY KNOWLEDGE AND THAT I HAVE PROVIDED " THIS INFORMATION IN ORDER TO OBTAIN THE BENEFITS OF [place]).  I HEREBY AUTHORIZE ANY PHYSICIAN, CHIROPRACTOR, SURGEON, PRACTITIONER, OR OTHER PERSON, ANY HOSPITAL, INCLUDING [facility] OR [facility], ANY MEDICAL SERVICE ORGANIZATION, ANY INSURANCE COMPANY, OR OTHER INSTITUTION OR ORGANIZATION TO RELEASE TO EACH OTHER, ANY MEDICAL OR OTHER INFORMATION, INCLUDING BENEFITS PAID OR PAYABLE, PERTINENT TO THIS INJURY OR DISEASE, EXCEPT INFORMATION RELATIVE TO DIAGNOSIS, TREATMENT AND/OR COUNSELING FOR AIDS, PSYCHOLOGICAL CONDITIONS, ALCOHOL OR CONTROLLED SUBSTANCES, FOR WHICH I MUST GIVE SPECIFIC AUTHORIZATION.  A PHOTOSTAT OF THIS AUTHORIZATION SHALL BE AS VALID AS THE ORIGINAL.  Date:                                   Place:  [facility]          Employee’s Signature:   THIS REPORT MUST BE COMPLETED AND MAILED WITHIN 3 WORKING DAYS OF TREATMENT   Place DISCHARGE LOUNGE                       Name of Facility [facility]   Date  1/12/2024 Diagnosis  (S72.422A,  S72.432A) Closed bicondylar fracture of left femur, initial encounter ([facility])  (T14.8XXA) Severely comminuted fracture of the left distal femur  (I16.0) Hypertensive urgency  (F17.200) Tobacco dependence  (E87.6) Hypokalemia Is there evidence the injured employee was under the influence of alcohol and/or another controlled substance at the time of accident?   Hour  10:44 AM Description of Injury or Disease  Closed bicondylar fracture of left femur, initial encounter ([facility])  Fracture  Hypertensive urgency  Tobacco dependence  Hypokalemia No   Treatment  Comminuted femur fracture  Have you advised the patient to remain off work five days or more?         Yes   X-Ray Findings  Positive If Yes   From Date    To Date      From information given by the employee, together with medical evidence, can you directly  "connect this injury or occupational disease as job incurred? Yes If No, is employee capable of: Full Duty  No Modified Duty  No   Is additional medical care by a physician indicated? Yes If Modified Duty, Specify any Limitations / Restrictions       Do you know of any previous injury or disease contributing to this condition or occupational disease? No    Date 2/2/2024 Print Doctor’s Name Zohra Garcia certify the employer’s copy of this form was mailed on:   Address [address] [place] [address]  [phone] INSURER’S USE ONLY   Provider’s Tax ID Number   Telephone Dept: [phone]    Doctor’s Signature e-ZOHRA Garrison M.D. Degree    M.D.      Form C-4 (rev.10/07)                                                                         BRIEF DESCRIPTION OF RIGHTS AND BENEFITS  ([place]ursuant to [place] 616[place].050)    Notice of Injury or Occupational Disease (Incident Report Form C-1): If an injury or occupational disease (OD) arises out of and in the course of employment, you must provide written notice to your employer as soon as practicable, but no later than 7 days after the accident or OD. Your employer shall maintain a sufficient supply of the required forms.    Claim for Compensation (Form C-4): If medical treatment is sought, the form C-4 is available at the place of initial treatment. A completed \"Claim for Compensation\" (Form C-4) must be filed within 90 days after an accident or OD. The treating physician or chiropractor must, within 3 working days after treatment, complete and mail to the employer, the employer's insurer and third-party , the Claim for Compensation.    Medical Treatment: If you require medical treatment for your on-the-job injury or OD, you may be required to select a physician or chiropractor from a list provided by your workers’ compensation insurer, if it has contracted with an Organization for Managed Care (MCO) or Preferred Provider Organization (PPO) or providers " of health care. If your employer has not entered into a contract with an MCO or PPO, you may select a physician or chiropractor from the Panel of Physicians and Chiropractors. Any medical costs related to your industrial injury or OD will be paid by your insurer.    Temporary Total Disability (TTD): If your doctor has certified that you are unable to work for a period of at least 5 consecutive days, or 5 cumulative days in a 20-day period, or places restrictions on you that your employer does not accommodate, you may be entitled to TTD compensation.    Temporary Partial Disability (TPD): If the wage you receive upon reemployment is less than the compensation for TTD to which you are entitled, the insurer may be required to pay you TPD compensation to make up the difference. TPD can only be paid for a maximum of 24 months.    Permanent Partial Disability (PPD): When your medical condition is stable and there is an indication of a PPD as a result of your injury or OD, within 30 days, your insurer must arrange for an evaluation by a rating physician or chiropractor to determine the degree of your PPD. The amount of your PPD award depends on the date of injury, the results of the PPD evaluation, your age and wage.    Permanent Total Disability (PTD): If you are medically certified by a treating physician or chiropractor as permanently and totally disabled and have been granted a PTD status by your insurer, you are entitled to receive monthly benefits not to exceed 66 2/3% of your average monthly wage. The amount of your PTD payments is subject to reduction if you previously received a lump-sum PPD award.    Vocational Rehabilitation Services: You may be eligible for vocational rehabilitation services if you are unable to return to the job due to a permanent physical impairment or permanent restrictions as a result of your injury or occupational disease.    Transportation and Per Carrol Reimbursement: You may be eligible  for travel expenses and per rafa associated with medical treatment.    Reopening: You may be able to reopen your claim if your condition worsens after claim closure.     Appeal Process: If you disagree with a written determination issued by the insurer or the insurer does not respond to your request, you may appeal to the Department of Administration, , by following the instructions contained in your determination letter. You must appeal the determination within 70 days from the date of the determination letter at [address], [address], [place], [place] [address], or [address], [place], [place] [address]. If you disagree with the  decision, you may appeal to the Department of Administration, . You must file your appeal within 30 days from the date of the  decision letter at [address], [address], [place], [place] [address], or [address] [facility] [address], [place], [place] [address]. If you disagree with a decision of an , you may file a petition for judicial review with the District Court. You must do so within 30 days of the Appeal Officer’s decision. You may be represented by an  at your own expense or you may contact the [facility] for possible representation.    [place]  for Injured Workers (NAIW): If you disagree with a  decision, you may request that NAIW represent you without charge at an  Hearing. For information regarding denial of benefits, you may contact the [facility] at: [address], [address], [place] [address], [phone], or [address] [facility] [address], [place] [address], [phone]    To File a Complaint with the Division: If you wish to file a complaint with the  of the Division of Industrial Relations (DIR),  please contact the Workers’ Compensation Section, [address], [address], [place],  [place] [address], telephone [phone], or [address], [address], [place], [place] [address], telephone [phone].    For assistance with Workers’ Compensation Issues: You may contact the [facility] Office for Consumer Health Assistance, [address], [address], [place], [place] [address], Toll Free 1-911.511.5077, Web site: http://[facility].nv.gov/Programs/CHAR E-mail: char@[facility].nv.gov  D-2 (rev. 10/20)              __________________________________________________________________                                    _________________            Employee Name / Signature                                                                                                                            Date

## 2024-01-12 NOTE — ASSESSMENT & PLAN NOTE
Orthopedics consulted,  S/p ORIF 1/12.   Ordered antispasmodic. Monitor mentation/sedation score, respirations blood ressure if on narcotics.  Patient now agreeable to go to rehab versus SNF although she continues to have doubts.

## 2024-01-13 LAB
ALBUMIN SERPL BCP-MCNC: 3.4 G/DL (ref 3.2–4.9)
ALBUMIN/GLOB SERPL: 1.5 G/DL
ALP SERPL-CCNC: 86 U/L (ref 30–99)
ALT SERPL-CCNC: 15 U/L (ref 2–50)
ANION GAP SERPL CALC-SCNC: 11 MMOL/L (ref 7–16)
AST SERPL-CCNC: 23 U/L (ref 12–45)
BILIRUB SERPL-MCNC: 0.3 MG/DL (ref 0.1–1.5)
BUN SERPL-MCNC: 9 MG/DL (ref 8–22)
CALCIUM ALBUM COR SERPL-MCNC: 8.9 MG/DL (ref 8.5–10.5)
CALCIUM SERPL-MCNC: 8.4 MG/DL (ref 8.5–10.5)
CHLORIDE SERPL-SCNC: 99 MMOL/L (ref 96–112)
CO2 SERPL-SCNC: 22 MMOL/L (ref 20–33)
CREAT SERPL-MCNC: 0.41 MG/DL (ref 0.5–1.4)
ERYTHROCYTE [DISTWIDTH] IN BLOOD BY AUTOMATED COUNT: 42.6 FL (ref 35.9–50)
GFR SERPLBLD CREATININE-BSD FMLA CKD-EPI: 106 ML/MIN/1.73 M 2
GLOBULIN SER CALC-MCNC: 2.2 G/DL (ref 1.9–3.5)
GLUCOSE SERPL-MCNC: 123 MG/DL (ref 65–99)
HCT VFR BLD AUTO: 36.9 % (ref 37–47)
HGB BLD-MCNC: 12.3 G/DL (ref 12–16)
MAGNESIUM SERPL-MCNC: 1.9 MG/DL (ref 1.5–2.5)
MCH RBC QN AUTO: 31.2 PG (ref 27–33)
MCHC RBC AUTO-ENTMCNC: 33.3 G/DL (ref 32.2–35.5)
MCV RBC AUTO: 93.7 FL (ref 81.4–97.8)
PLATELET # BLD AUTO: 243 K/UL (ref 164–446)
PMV BLD AUTO: 9.8 FL (ref 9–12.9)
POTASSIUM SERPL-SCNC: 4.3 MMOL/L (ref 3.6–5.5)
PROT SERPL-MCNC: 5.6 G/DL (ref 6–8.2)
RBC # BLD AUTO: 3.94 M/UL (ref 4.2–5.4)
SODIUM SERPL-SCNC: 132 MMOL/L (ref 135–145)
WBC # BLD AUTO: 15 K/UL (ref 4.8–10.8)

## 2024-01-13 PROCEDURE — 770001 HCHG ROOM/CARE - MED/SURG/GYN PRIV*

## 2024-01-13 PROCEDURE — A9270 NON-COVERED ITEM OR SERVICE: HCPCS | Performed by: HOSPITALIST

## 2024-01-13 PROCEDURE — 85027 COMPLETE CBC AUTOMATED: CPT

## 2024-01-13 PROCEDURE — 99232 SBSQ HOSP IP/OBS MODERATE 35: CPT | Performed by: INTERNAL MEDICINE

## 2024-01-13 PROCEDURE — A9270 NON-COVERED ITEM OR SERVICE: HCPCS | Performed by: INTERNAL MEDICINE

## 2024-01-13 PROCEDURE — 700111 HCHG RX REV CODE 636 W/ 250 OVERRIDE (IP): Performed by: HOSPITALIST

## 2024-01-13 PROCEDURE — 83735 ASSAY OF MAGNESIUM: CPT

## 2024-01-13 PROCEDURE — 700102 HCHG RX REV CODE 250 W/ 637 OVERRIDE(OP): Performed by: HOSPITALIST

## 2024-01-13 PROCEDURE — 80053 COMPREHEN METABOLIC PANEL: CPT

## 2024-01-13 PROCEDURE — 36415 COLL VENOUS BLD VENIPUNCTURE: CPT

## 2024-01-13 PROCEDURE — 97162 PT EVAL MOD COMPLEX 30 MIN: CPT

## 2024-01-13 PROCEDURE — 700102 HCHG RX REV CODE 250 W/ 637 OVERRIDE(OP): Performed by: INTERNAL MEDICINE

## 2024-01-13 RX ORDER — DIAZEPAM 5 MG/1
5 TABLET ORAL EVERY 8 HOURS PRN
Status: DISCONTINUED | OUTPATIENT
Start: 2024-01-13 | End: 2024-01-19 | Stop reason: HOSPADM

## 2024-01-13 RX ORDER — ENOXAPARIN SODIUM 100 MG/ML
40 INJECTION SUBCUTANEOUS DAILY
Status: DISCONTINUED | OUTPATIENT
Start: 2024-01-14 | End: 2024-01-19 | Stop reason: HOSPADM

## 2024-01-13 RX ADMIN — OMEPRAZOLE 40 MG: 20 CAPSULE, DELAYED RELEASE ORAL at 17:24

## 2024-01-13 RX ADMIN — DIAZEPAM 5 MG: 5 TABLET ORAL at 22:34

## 2024-01-13 RX ADMIN — DOCUSATE SODIUM 50 MG AND SENNOSIDES 8.6 MG 2 TABLET: 8.6; 5 TABLET, FILM COATED ORAL at 04:14

## 2024-01-13 RX ADMIN — HYDROMORPHONE HYDROCHLORIDE 0.5 MG: 1 INJECTION, SOLUTION INTRAMUSCULAR; INTRAVENOUS; SUBCUTANEOUS at 14:51

## 2024-01-13 RX ADMIN — HYDROMORPHONE HYDROCHLORIDE 0.5 MG: 1 INJECTION, SOLUTION INTRAMUSCULAR; INTRAVENOUS; SUBCUTANEOUS at 21:28

## 2024-01-13 RX ADMIN — OXYCODONE 5 MG: 5 TABLET ORAL at 13:28

## 2024-01-13 RX ADMIN — OXYCODONE 5 MG: 5 TABLET ORAL at 23:46

## 2024-01-13 RX ADMIN — OXYCODONE 5 MG: 5 TABLET ORAL at 17:24

## 2024-01-13 RX ADMIN — DOCUSATE SODIUM 50 MG AND SENNOSIDES 8.6 MG 2 TABLET: 8.6; 5 TABLET, FILM COATED ORAL at 17:24

## 2024-01-13 RX ADMIN — HYDROMORPHONE HYDROCHLORIDE 0.5 MG: 1 INJECTION, SOLUTION INTRAMUSCULAR; INTRAVENOUS; SUBCUTANEOUS at 11:43

## 2024-01-13 RX ADMIN — OXYCODONE 5 MG: 5 TABLET ORAL at 20:24

## 2024-01-13 RX ADMIN — FLUOXETINE HYDROCHLORIDE 40 MG: 20 CAPSULE ORAL at 04:14

## 2024-01-13 RX ADMIN — HYDROMORPHONE HYDROCHLORIDE 0.5 MG: 1 INJECTION, SOLUTION INTRAMUSCULAR; INTRAVENOUS; SUBCUTANEOUS at 18:07

## 2024-01-13 RX ADMIN — OMEPRAZOLE 40 MG: 20 CAPSULE, DELAYED RELEASE ORAL at 04:14

## 2024-01-13 RX ADMIN — OXYCODONE 5 MG: 5 TABLET ORAL at 10:28

## 2024-01-13 RX ADMIN — OXYCODONE 5 MG: 5 TABLET ORAL at 07:28

## 2024-01-13 RX ADMIN — HYDROMORPHONE HYDROCHLORIDE 0.5 MG: 1 INJECTION, SOLUTION INTRAMUSCULAR; INTRAVENOUS; SUBCUTANEOUS at 11:22

## 2024-01-13 RX ADMIN — FLUOXETINE HYDROCHLORIDE 40 MG: 20 CAPSULE ORAL at 17:24

## 2024-01-13 RX ADMIN — NICOTINE 14 MG: 14 PATCH TRANSDERMAL at 04:15

## 2024-01-13 RX ADMIN — HYDROMORPHONE HYDROCHLORIDE 0.5 MG: 1 INJECTION, SOLUTION INTRAMUSCULAR; INTRAVENOUS; SUBCUTANEOUS at 14:30

## 2024-01-13 RX ADMIN — CYCLOBENZAPRINE 5 MG: 10 TABLET, FILM COATED ORAL at 12:50

## 2024-01-13 RX ADMIN — OXYCODONE 5 MG: 5 TABLET ORAL at 03:23

## 2024-01-13 RX ADMIN — HYDROMORPHONE HYDROCHLORIDE 0.5 MG: 1 INJECTION, SOLUTION INTRAMUSCULAR; INTRAVENOUS; SUBCUTANEOUS at 08:05

## 2024-01-13 ASSESSMENT — PAIN DESCRIPTION - PAIN TYPE
TYPE: ACUTE PAIN
TYPE: SURGICAL PAIN
TYPE: ACUTE PAIN
TYPE: SURGICAL PAIN
TYPE: SURGICAL PAIN
TYPE: ACUTE PAIN
TYPE: ACUTE PAIN
TYPE: SURGICAL PAIN
TYPE: ACUTE PAIN
TYPE: SURGICAL PAIN

## 2024-01-13 ASSESSMENT — ENCOUNTER SYMPTOMS
SEIZURES: 0
HEMOPTYSIS: 0
CONSTIPATION: 0
CHILLS: 0
TREMORS: 0
BLOOD IN STOOL: 0
HEADACHES: 0
FALLS: 1
VOMITING: 0
COUGH: 0
WHEEZING: 0
FEVER: 0
NERVOUS/ANXIOUS: 0
NAUSEA: 0
DIARRHEA: 0
LOSS OF CONSCIOUSNESS: 0
INSOMNIA: 0
ABDOMINAL PAIN: 0
DIZZINESS: 0
WEAKNESS: 0
SHORTNESS OF BREATH: 0
EYE PAIN: 0
MYALGIAS: 1
FOCAL WEAKNESS: 0
EYE REDNESS: 0
PALPITATIONS: 0

## 2024-01-13 ASSESSMENT — COGNITIVE AND FUNCTIONAL STATUS - GENERAL
DAILY ACTIVITIY SCORE: 16
MOVING TO AND FROM BED TO CHAIR: A LOT
HELP NEEDED FOR BATHING: A LITTLE
DRESSING REGULAR UPPER BODY CLOTHING: A LITTLE
TURNING FROM BACK TO SIDE WHILE IN FLAT BAD: A LITTLE
WALKING IN HOSPITAL ROOM: A LOT
TURNING FROM BACK TO SIDE WHILE IN FLAT BAD: A LOT
SUGGESTED CMS G CODE MODIFIER MOBILITY: CL
DRESSING REGULAR LOWER BODY CLOTHING: A LOT
STANDING UP FROM CHAIR USING ARMS: A LOT
MOBILITY SCORE: 11
MOVING FROM LYING ON BACK TO SITTING ON SIDE OF FLAT BED: A LOT
CLIMB 3 TO 5 STEPS WITH RAILING: A LOT
MOBILITY SCORE: 13
WALKING IN HOSPITAL ROOM: A LOT
CLIMB 3 TO 5 STEPS WITH RAILING: A LOT
TOILETING: A LOT
MOVING FROM LYING ON BACK TO SITTING ON SIDE OF FLAT BED: UNABLE
SUGGESTED CMS G CODE MODIFIER MOBILITY: CL
EATING MEALS: A LITTLE
PERSONAL GROOMING: A LITTLE
STANDING UP FROM CHAIR USING ARMS: A LOT
SUGGESTED CMS G CODE MODIFIER DAILY ACTIVITY: CK
MOVING TO AND FROM BED TO CHAIR: A LOT

## 2024-01-13 ASSESSMENT — GAIT ASSESSMENTS: GAIT LEVEL OF ASSIST: UNABLE TO PARTICIPATE

## 2024-01-13 NOTE — CONSULTS
DATE OF SERVICE:  01/12/2024     ORTHOPEDIC CONSULTATION     REQUESTING PHYSICIAN:  Kb Garcia MD, emergency department.     REASON FOR CONSULTATION:  Left distal femur fracture.     CHIEF COMPLAINT:  Left knee pain.     HISTORY OF PRESENT ILLNESS:  The patient is 68 years old.  She has a history   of osteoporosis.  She also has a history of left total hip arthroplasty.  She   slipped on ice, injuring her left knee.  She presented to [facility], found to   have a comminuted displaced supracondylar femur fracture.  She was placed in a   knee immobilizer and admitted to the hospitalist service.  I was consulted to   provide treatment recommendations.  She denies other injuries.     PAST MEDICAL HISTORY:  ALLERGIES:  TO SULFA MEDICATIONS.     PAST MEDICAL DIAGNOSES: Osteoporosis, psychiatric disorder, heartburn and   arthritis.     PAST SURGICAL HISTORY: Left total knee arthroplasty in 2016, cardiac surgery.     SOCIAL HISTORY:  The patient smokes about half pack of cigarettes a day.    Denies alcohol and illicit drug use     PHYSICAL EXAMINATION:  VITAL SIGNS:  Temperature 97.2, heart rate 100, respiratory rate 18, blood   pressure 148/72, pulse oximetry 94% on 2 liters nasal cannula.  GENERAL APPEARANCE: The patient is in mild amount of distress due to pain in   left thigh.  She is alert and following commands.  MUSCULOSKELETAL:  Left lower extremity: She has a knee immobilizer in place.    She is able dorsi and plantarflex the foot and flex and extend toes.  She has   palpable dorsalis pedis and posterior tibial pulses.  There is no evidence of   obvious traumatic deformity of the right lower or bilateral upper extremities   which are grossly neurovascularly intact     DIAGNOSTIC IMAGING:  Plain x-rays of the left femur as well as CT of the left   knee show comminuted displaced supracondylar femur fracture.  There is no   obvious intercondylar extension.  There is a left total hip arthroplasty press   fit within  the proximal femur without obvious complication seen.     ASSESSMENT:  A 68-year-old female with left comminuted displaced supracondylar   femur fracture.     RECOMMENDATIONS:  1.  I discussed these findings with the patient and I do recommend surgical   reduction and fixation.  Wait for the opportunity to get her to the operating   room today for surgical management, which I feel would help facilitate and   expedite her rehabilitation.  2.  We did discuss risks of surgery including infection, knee stiffness,   nonunion, malunion, potential for injury to neurovascular structures, blood   loss, possibly requiring transfusion, DVT, pulmonary embolism, implant   prominence, possibly requiring removal and general risks of anesthesia.  She   expressed understanding and wished to proceed with surgery when possible.  3.  She continued to be n.p.o. and nonweightbearing left lower extremity in   her knee immobilizer. I will make preparations to get her to the operating   room later this evening for surgical management.        ______________________________  MD KADEEM Kidd/MORA/LAMINE    DD:  01/12/2024 18:48  DT:  01/12/2024 19:27    Job#:  [id number]

## 2024-01-13 NOTE — OR NURSING
2050 Pt from OR, asleep, with O2 support of 6 L/min via mask, respirations regular and spontaneous, baseline VS noted, anesthesia aware about high BP. Left leg post op site dressing CDI, left foot pink in color, DP pulse present by palpation, strong and bounding, left toes with brisk cap refill. SCD ON RLE, bed set to lowest position and locked in place.    2143 Report given to Dayna AYALA.    2157 Pt to room with transport, with O2 support of 3 L/min via nasal cannula (O2 tank at 536). Pt belongings (earrings) with the pt at bedside, transporter aware.

## 2024-01-13 NOTE — PROGRESS NOTES
"    Orthopedic PA Progress Note    Interval changes:  Patient doing well postop  LLE dressings are CDI  TTWB LLE  No pending ortho procedures  SCDs to remain on  DVT Prophylaxis outpatient: ASA 81 mg PO BID x4 weeks  Follow up with Dr. Amezquita 2 weeks postop  Cleared for DC from orthopedic standpoint pending therapy recs and medical optimization    ROS - Patient denies any new issues.  Denies any numbness or tingling. Pain well controlled.    /69   Pulse 100   Temp 36.7 °C (98.1 °F) (Temporal)   Resp 20   Ht 1.6 m (5' 3\")   Wt 75.8 kg (167 lb)   SpO2 93%     Patient seen and examined  No acute distress  Breathing non labored  RRR  LLE: Surgical dressing is clean, dry, and intact. Patient clearly fires tibialis anterior, EHL, and gastrocnemius/soleus. Sensation is intact to light touch throughout superficial peroneal, deep peroneal, tibial, saphenous, and sural nerve distributions. Strong and palpable 2+ dorsalis pedis and posterior tibial pulses with capillary refill less than 2 seconds.     Recent Labs     01/12/24  0905 01/13/24  0541   WBC 9.1 15.0*   RBC 4.79 3.94*   HEMOGLOBIN 15.0 12.3   HEMATOCRIT 45.5 36.9*   MCV 95.0 93.7   MCH 31.3 31.2   MCHC 33.0 33.3   RDW 43.0 42.6   PLATELETCT 258 243   MPV 10.2 9.8       Active Hospital Problems    Diagnosis     Severely comminuted fracture of the left distal femur [T14.8XXA]     Tobacco dependence [F17.200]     Hypertensive urgency [I16.0]     BMI 29.0-29.9,adult [Z68.29]     Anxiety [F41.9]     Hyponatremia [E87.1]     Dehydration [E86.0]     Gastroesophageal reflux disease [K21.9]        Assessment/Plan:  Patient doing well postop  LLE dressings are CDI  TTWB LLE  No pending ortho procedures  SCDs to remain on  DVT Prophylaxis outpatient: ASA 81 mg PO BID x4 weeks  Follow up with Dr. Amezquita 2 weeks postop  Cleared for DC from orthopedic standpoint pending therapy recs and medical optimization    POD#1 S/p  Open treatment with internal fixation of " right   supracondylar femur fracture without intercondylar extension.  Wt bearing status - TTWB LLE  Wound care/Drains - Dressings to be changed every other day by nursing. Or PRN for saturation starting POD#2  Future Procedures - None planned   Lovenox: Start 1/13, Duration-until ambulatory > 150'  Sutures/Staples out- 14-21 days post operatively. Removal will completed by ortho ADONIS's unless transferred.  DVT Prophylaxis outpatient: ASA 81 mg PO BID x4 weeks  PT/OT-initiated  Antibiotics:  Perioperative completed  DVT Prophylaxis- TEDS/SCDs/Foot pumps  Flanagan-not needed per ortho  Case Coordination for Discharge Planning - Disposition per therapy recs.

## 2024-01-13 NOTE — ANESTHESIA POSTPROCEDURE EVALUATION
Patient: Windy León    Procedure Summary       Date: 01/12/24 Room / Location: [place] [address] / SURGERY [facility]    Anesthesia Start: 1922 Anesthesia Stop: 2050    Procedure: ORIF, FRACTURE, DISTAL FEMUR (Left: Leg Upper) Diagnosis: (left displaced, comminuted supracondylar femur fracture)    Surgeons: Edi Amezquita M.D. Responsible Provider: Chad Kuo M.D.    Anesthesia Type: general ASA Status: 2            Final Anesthesia Type: general  Last vitals  BP   Blood Pressure : (!) 148/72    Temp   36.2 °C (97.2 °F)    Pulse   100   Resp   18    SpO2   94 %      Anesthesia Post Evaluation    Patient location during evaluation: PACU  Patient participation: complete - patient participated  Level of consciousness: awake  Pain score: 2    Airway patency: patent  Anesthetic complications: no  Cardiovascular status: hemodynamically stable  Respiratory status: acceptable  Hydration status: acceptable    PONV: none          No notable events documented.     Nurse Pain Score: 10 (NPRS)

## 2024-01-13 NOTE — PROGRESS NOTES
68yoF with left displaced, comminuted supracondylar femur fracture.  Planning surgical fixation today.  See full dictated consultation for further details.

## 2024-01-13 NOTE — ASSESSMENT & PLAN NOTE
Likely secondary to reduced oral intake.   Encourage oral intake as tolerated, antiemetics as needed.

## 2024-01-13 NOTE — PROGRESS NOTES
4 Eyes Skin Assessment Completed by Dayna RN and Jailene RN.    Head WDL  Ears WDL  Nose WDL  Mouth WDL  Neck WDL  Breast/Chest WDL  Shoulder Blades WDL  Spine WDL  (R) Arm/Elbow/Hand WDL  (L) Arm/Elbow/Hand WDL  Abdomen WDL  Groin WDL  Scrotum/Coccyx/Buttocks WDL  (R) Leg WDL  (L) Leg Incision  (R) Heel/Foot/Toe WDL  (L) Heel/Foot/Toe WDL          Devices In Places Blood Pressure Cuff, Pulse Ox, SCD's, and Nasal Cannula      Interventions In Place NC W/Ear Foams, Waffle Overlay, TAP System, Pillows, Dri-Zak Pads, Heels Loaded W/Pillows, and Pressure Redistribution Mattress    Possible Skin Injury No    Pictures Uploaded Into Epic N/A  Wound Consult Placed N/A  RN Wound Prevention Protocol Ordered No

## 2024-01-13 NOTE — ANESTHESIA PREPROCEDURE EVALUATION
Case: [id number] Date/Time: 01/12/24 1823    Procedure: ORIF, FRACTURE, DISTAL FEMUR (Left)    Location: [place] [address] / SURGERY [facility]    Surgeons: Edi Amezquita M.D.            Relevant Problems   ANESTHESIA (within normal limits)      PULMONARY (within normal limits)      NEURO   (negative) CVA (cerebral vascular accident) (HCC)   (negative) Neuromuscular disease (HCC)   (negative) Seizures (HCC)   (negative) TIA (transient ischemic attack)      CARDIAC   (positive) Hypertensive urgency   (negative) CAD (coronary artery disease)   (negative) Dysrhythmia      GI   (positive) Gastroesophageal reflux disease      ENDO (within normal limits)       Physical Exam    Airway   Mallampati: II  TM distance: >3 FB  Neck ROM: full       Cardiovascular - normal exam  Rhythm: regular  Rate: normal  (-) murmur     Dental - normal exam           Pulmonary - normal exam  Breath sounds clear to auscultation     Abdominal    Neurological - normal exam                   Anesthesia Plan    ASA 2       Plan - general       Airway plan will be ETT          Induction: intravenous    Postoperative Plan: Postoperative administration of opioids is intended.    Pertinent diagnostic labs and testing reviewed    Informed Consent:    Anesthetic plan and risks discussed with patient.    Use of blood products discussed with: patient whom consented to blood products.

## 2024-01-13 NOTE — PROGRESS NOTES
68yoF with left displaced, comminuted supracondylar femur fracture s/p ORIF today.      S: appears comfortable waking from anesthesia in PACU    O:    Vitals:    01/12/24 1838   BP: (!) 148/72   Pulse: 100   Resp: 18   Temp: 36.2 °C (97.2 °F)   SpO2: 94%     Exam:  General-NAD, waking from anesthesia but following commands  LLE-thigh dressing c/d/I, NVI distally    A: 68yoF with left displaced, comminuted supracondylar femur fracture s/p ORIF 1/12.    Recs:  --readmit postop  --TTWB LLE  --knee ROM as tolerated, no knee brace needed  --ancef x 2 doses postop  --PT/OT for mobilization ASAP  --okay to start lovenox or equivalent tomorrow am, continue SCDs  --fu 2 weeks postop

## 2024-01-13 NOTE — CARE PLAN
The patient is Stable - Low risk of patient condition declining or worsening    Shift Goals  Clinical Goals: pain control, surgery today 1/12  Patient Goals: pain control    Progress made toward(s) clinical / shift goals:    Problem: Pain - Standard  Goal: Alleviation of pain or a reduction in pain to the patient’s comfort goal  Outcome: Progressing  Note: Patient educated on pain  scale and to notify RN of pain. Medicated per MAR and repositioned        Problem: Knowledge Deficit - Standard  Goal: Patient and family/care givers will demonstrate understanding of plan of care, disease process/condition, diagnostic tests and medications  Outcome: Progressing  Note: Plan of care discussed, verbalized understanding. Questions answered        Problem: Skin Integrity  Goal: Skin integrity is maintained or improved  Outcome: Progressing     Problem: Fall Risk  Goal: Patient will remain free from falls  Outcome: Progressing       Patient is not progressing towards the following goals:

## 2024-01-13 NOTE — THERAPY
Physical Therapy   Initial Evaluation     Patient Name: Windy León  Age:  68 y.o., Sex:  female  Medical Record #: [id number]  Today's Date: 1/13/2024       Precautions: Fall Risk;Toe Touch Weight Bearing Left Lower Extremity    Assessment  Patient is 68 y.o. female presented 1/12/24 with left hip pain after a fall. Now s/p L distal femur ORIF and TTWB on LLE.     Patient received supine in bed, agreeable to evaluation. Presents with significant pain in LLE and unable to mobilize LLE without assist from BUEs. Patient is well below baseline - requiring Max to mobilize to EOB. Despite education on Wbing precaution, patient was unable to stand or transfer OOB to chair. Per nurse, patient has been in immense pain and consistently inquiring about pain meds.    Patient lives alone in a mobile home with 5 MOMO.     Recommend post-acute placement at this time. Will continue to provide acute PT services while admitted.       Plan    Physical Therapy Initial Treatment Plan   Treatment Plan : (P) Bed Mobility, Gait Training, Equipment, Neuro Re-Education / Balance, Self Care / Home Evaluation, Stair Training, Therapeutic Activities, Therapeutic Exercise  Treatment Frequency: (P) 5 Times per Week  Duration: (P) Until Therapy Goals Met    DC Equipment Recommendations: (P) Unable to determine at this time  Discharge Recommendations: (P) Recommend post-acute placement for additional physical therapy services prior to discharge home        Objective       01/13/24 1420   Precautions   Precautions Fall Risk;Toe Touch Weight Bearing Left Lower Extremity   Vitals   O2 (LPM) 3   O2 Delivery Device Nasal Cannula   Pain   Intervention Medication (see MAR);Rest;Repositioned;Relaxation Technique   Pain 0 - 10 Group   Location Leg;Knee   Location Orientation Left;Lateral   Pain Rating Scale (NPRS) 10   Description Aching;Constant   Comfort Goal Comfort with Movement;Perform Activity;Sleep Comfortably   Therapist Pain Assessment Post Activity  Pain Same as Prior to Activity   Non Verbal Descriptors   Non Verbal Scale  Grimacing;Crying;Moaning   Prior Living Situation   Prior Services Home-Independent   Housing / Facility Mobile Home   Steps Into Home 5   Steps In Home 0   Rail Both Rail (Steps into Home)   Bathroom Set up Walk In Shower;Shower Chair   Equipment Owned Front-Wheel Walker;Wheelchair;Tub / Shower Seat   Lives with - Patient's Self Care Capacity Alone and Able to Care For Self   Prior Level of Functional Mobility   Bed Mobility Independent   Transfer Status Independent   Ambulation Independent   Ambulation Distance community   Assistive Devices Used None   Stairs Independent   Comments  at [facility]   History of Falls   History of Falls Yes   Date of Last Fall   (reason for admission)   Cognition    Level of Consciousness Alert   Active ROM Lower Body    Active ROM Lower Body  X   Comments LLE limited by pain   Strength Lower Body   Lower Body Strength  X   Comments RLE generalized weakness, LLE limited by pain. Unable to lift LLE   Sensation Lower Body   Lower Extremity Sensation   WDL   Neurological Concerns   Neurological Concerns No   Other Treatments   Other Treatments Provided Education on breathing techniques for relaxation, mobility to access home, POC, discharge planning, mobilization with AD, LLE TTWB precautions   Balance Assessment   Sitting Balance (Static) Fair -   Sitting Balance (Dynamic) Poor +   Weight Shift Sitting Poor   Weight Shift Standing Absent   Comments Unable to stand d/t pain, LLE TTWB   Bed Mobility    Supine to Sit Maximal Assist   Sit to Supine Maximal Assist   Scooting Maximal Assist   Rolling Moderate Assist to Rt.   Comments with bed features   Gait Analysis   Gait Level Of Assist Unable to Participate   Functional Mobility   Sit to Stand Unable to Participate   Bed, Chair, Wheelchair Transfer Unable to Participate   Mobility to EOB   How much difficulty does the patient currently have...    Turning over in bed (including adjusting bedclothes, sheets and blankets)? 2   Sitting down on and standing up from a chair with arms (e.g., wheelchair, bedside commode, etc.) 1   Moving from lying on back to sitting on the side of the bed? 2   How much help from another person does the patient currently need...   Moving to and from a bed to a chair (including a wheelchair)? 2   Need to walk in a hospital room? 2   Climbing 3-5 steps with a railing? 2   6 clicks Mobility Score 11   Activity Tolerance   Sitting Edge of Bed 2-3 minutes   Comments unable to stand due to pain   Patient / Family Goals    Patient / Family Goal #1 go home   Short Term Goals    Short Term Goal # 1 pt will perform supine <> sit with SPV in 6 visits   Short Term Goal # 2 pt will perform sit < > stand with FWW and CGA in 6 visits   Short Term Goal # 3 pt will complete stand-pivot transfer bed > chair (or wheelchair) with Gina in 6 visits   Short Term Goal # 4 Pt will tolerate sitting in chair for 1 hour in 6 visits   Education Group   Education Provided Role of Physical Therapist;Use of Assistive Device;Transfer Status;Weight Bearing Precautions   Role of Physical Therapist Patient Response Patient;Acceptance;Explanation;Verbal Demonstration   Use of Assistive Device Patient Response Patient;Acceptance;Explanation;Demonstration;Verbal Demonstration   Transfer Status Patient Response Patient;Acceptance;Explanation;Demonstration;Verbal Demonstration   Weight Bearing Precautions Patient Response Patient;Acceptance;Demonstration;Explanation;Verbal Demonstration   Additional Comments receptive   Physical Therapy Initial Treatment Plan    Treatment Plan  Bed Mobility;Gait Training;Equipment;Neuro Re-Education / Balance;Self Care / Home Evaluation;Stair Training;Therapeutic Activities;Therapeutic Exercise   Treatment Frequency 5 Times per Week   Duration Until Therapy Goals Met   Problem List    Problems Pain;Impaired Bed Mobility;Impaired  Transfers;Impaired Ambulation;Functional Strength Deficit;Functional ROM Deficit;Impaired Balance;Limited Knowledge of Post-Op Precautions;Decreased Activity Tolerance   Anticipated Discharge Equipment and Recommendations   DC Equipment Recommendations Unable to determine at this time   Discharge Recommendations Recommend post-acute placement for additional physical therapy services prior to discharge home   Interdisciplinary Plan of Care Collaboration   IDT Collaboration with  Nursing   Patient Position at End of Therapy In Bed;Call Light within Reach;Family / Friend in Room;Tray Table within Reach   Collaboration Comments RN updated   Session Information   Date / Session Number  1/13 - 1 (1/5, 1/19)

## 2024-01-13 NOTE — OP REPORT
DATE OF SERVICE:  01/12/2024     PREOPERATIVE DIAGNOSIS:  Left comminuted displaced supracondylar femur   fracture.     POSTOPERATIVE DIAGNOSIS:  Left comminuted displaced supracondylar femur   fracture.     PROCEDURE PERFORMED:  Open treatment with internal fixation of right   supracondylar femur fracture without intercondylar extension.     SURGEON:  Edi Amezquita MD     ANESTHESIOLOGISTS:  1.  Rima Van.  2.  Chad Kuo MD     ANESTHESIA:  General.     ESTIMATED BLOOD LOSS:  200 mL.     IMPLANTS:  Mendez lateral distal femoral locking plate with combination of   locking and nonlocking screws.     INDICATIONS FOR PROCEDURE:  The patient is 68 years old.  She slipped on ice,   injuring her left thigh.  She sustained a left comminuted displaced   supracondylar femur fracture.  She has a history of a left total hip   arthroplasty on that side as well.  I was consulted by treatment   recommendations and she was evaluated for admission to the hospitalist   service, felt to be medically optimized for surgical management.  I   recommended surgical reduction and fixation.  We discussed risks and   alternatives to surgery.  She expressed understanding and wished to proceed   with surgery as outlined above.     DESCRIPTION OF PROCEDURE:  The patient was met in the preoperative holding   area.  Her surgical site was signed.  Her consent was confirmed to be   accurate.  She was taken back to the operating room and general anesthesia was   induced.  The left thigh and lower extremity were provisionally cleansed with   isopropyl alcohol and then prepped and draped in the usual sterile fashion.    A formal timeout was performed to confirm patient's correct name, correct   surgical site, correct procedure and correct laterality.  A lateral incision   was made centered over the supracondylar femur area with scalpel down through   skin.  Dissection was carried with Bovie cautery through subcutaneous tissue   down  through the iliotibial band.  Vastus lateralis muscle was elevated   anteriorly.  I localized the area of extensive comminution in the   supracondylar femur area, traction and placing a bump posteriorly and improve   the overall alignment.  I then slid a submuscular distal femoral locking plate   with appropriate position distally, pinned it into place, proximally, I made   a counter incision, confirmed it was well seated on bone and overlapping at   the tip of the stem of the femoral prosthesis and pinned it into place   anterior to the plate and pulled little bit more traction until I felt the   fracture was out to length.  I used a collinear reduction clamp to fine tune   the fracture reduction distally and placed a bicortical nonlocking screw   proximally and then several locking screws distally and three more bicortical   nonlocking screws proximally using percutaneous technique.  I then removed all   the K-wires, reduction forceps, the distal segment sprung medially just a   little bit but overall I felt the overall length of the fracture and   rotational profile despite significant comminution was overall acceptable and   the position of the implant was acceptable as well.  The wound was thoroughly   irrigated with normal saline and repaired the IT band with 0 Vicryl,   subcutaneous layers with 0 Vicryl, 2-0 Vicryl and the skin edges with staples   after the wounds were thoroughly irrigated with normal saline.  I placed   sterile dressings.  We removed the drapes and removed the bump under her left   buttock and she had symmetric rotational profile to the bilateral lower   extremities.  I then placed a well-padded compressive dressing from foot to   thigh.  She was then awoken from anesthesia and transferred on the [facility] and   taken to postanesthesia care unit in stable condition.     PLAN:  1.  The patient will be re-admitted to the hospitalist service postop.  2.  She should be toe touch weightbearing to  left lower extremity.  3.  She can perform knee range of motion as tolerated.  No knee brace as   needed.  4.  She will need Ancef for 2 doses postop for infection prophylaxis.  5.  She should work with physical and occupational therapy for mobilization   and gait training.  6.  Okay to start Lovenox tomorrow morning or equivalent if otherwise   clinically appropriate and should continue SCDs for DVT prophylaxis in the   meantime.  7.  Ultimately, she should follow up with me 2 weeks postop for routine wound   check, staple removal and x-rays, 2 views of left femur.        ______________________________  MD KADEEM Kidd/MELISSA/MATIAS    DD:  01/12/2024 20:50  DT:  01/12/2024 22:03    Job#:  [id number]

## 2024-01-13 NOTE — ANESTHESIA TIME REPORT
Anesthesia Start and Stop Event Times       Date Time Event    1/12/2024 1915 Ready for Procedure     1922 Anesthesia Start     2050 Anesthesia Stop          Responsible Staff  01/12/24      Name Role Begin End    Winifred Patel M.D. Anesth 1922 1953    Chad Kuo M.D. Anesth 1953 2050          Overtime Reason:  no overtime (within assigned shift)    Comments:

## 2024-01-13 NOTE — OR SURGEON
Immediate Post OP Note    PreOp Diagnosis: Left comminuted, displaced supracondylar femur fracture      PostOp Diagnosis: same      Procedure(s):  ORIF, FRACTURE, DISTAL FEMUR - Wound Class: Clean    Surgeon(s):  Edi Amezquita M.D.    Anesthesiologist/Type of Anesthesia:  Anesthesiologist: Winifred Patel M.D.; Chad Kuo M.D./General    Surgical Staff:  Circulator: Nehemias Phillips R.N.  Scrub Person: Mily Lopez  Radiology Technologist: Nadine Urbina; Carola Raya    Specimens removed if any:  * No specimens in log *    Estimated Blood Loss: 200cc    Findings: see dictation    Complications: none known    PLAN:  --readmit postop  --TTWB LLE  --knee ROM as tolerated, no knee brace needed  --ancef x 2 doses postop  --PT/OT for mobilization ASAP  --okay to start lovenox or equivalent tomorrow am, continue SCDs  --fu 2 weeks postop        1/12/2024 8:46 PM Edi Amezquita M.D.

## 2024-01-13 NOTE — ANESTHESIA PROCEDURE NOTES
Airway    Date/Time: 1/12/2024 7:27 PM    Performed by: Winifred Patel M.D.  Authorized by: Winifred Patel M.D.    Location:  OR  Urgency:  Elective  Indications for Airway Management:  Anesthesia      Spontaneous Ventilation: absent    Sedation Level:  Deep  Preoxygenated: Yes    Patient Position:  Sniffing  Mask Difficulty Assessment:  0 - not attempted  Final Airway Type:  Endotracheal airway  Final Endotracheal Airway:  ETT  Cuffed: Yes    Technique Used for Successful ETT Placement:  Direct laryngoscopy    Insertion Site:  Oral  Blade Type:  Eric  Laryngoscope Blade/Videolaryngoscope Blade Size:  3  ETT Size (mm):  6.5  Measured from:  Teeth  ETT to Teeth (cm):  22  Placement Verified by: auscultation and capnometry    Cormack-Lehane Classification:  Grade I - full view of glottis  Number of Attempts at Approach:  1

## 2024-01-14 LAB
ALBUMIN SERPL BCP-MCNC: 2.9 G/DL (ref 3.2–4.9)
BUN SERPL-MCNC: 8 MG/DL (ref 8–22)
CALCIUM ALBUM COR SERPL-MCNC: 9 MG/DL (ref 8.5–10.5)
CALCIUM SERPL-MCNC: 8.1 MG/DL (ref 8.5–10.5)
CHLORIDE SERPL-SCNC: 101 MMOL/L (ref 96–112)
CO2 SERPL-SCNC: 24 MMOL/L (ref 20–33)
CREAT SERPL-MCNC: 0.4 MG/DL (ref 0.5–1.4)
ERYTHROCYTE [DISTWIDTH] IN BLOOD BY AUTOMATED COUNT: 44.6 FL (ref 35.9–50)
GFR SERPLBLD CREATININE-BSD FMLA CKD-EPI: 107 ML/MIN/1.73 M 2
GLUCOSE SERPL-MCNC: 126 MG/DL (ref 65–99)
HCT VFR BLD AUTO: 32.1 % (ref 37–47)
HGB BLD-MCNC: 10.3 G/DL (ref 12–16)
MCH RBC QN AUTO: 31 PG (ref 27–33)
MCHC RBC AUTO-ENTMCNC: 32.1 G/DL (ref 32.2–35.5)
MCV RBC AUTO: 96.7 FL (ref 81.4–97.8)
PHOSPHATE SERPL-MCNC: 2.5 MG/DL (ref 2.5–4.5)
PLATELET # BLD AUTO: 191 K/UL (ref 164–446)
PMV BLD AUTO: 9.8 FL (ref 9–12.9)
POTASSIUM SERPL-SCNC: 4.1 MMOL/L (ref 3.6–5.5)
RBC # BLD AUTO: 3.32 M/UL (ref 4.2–5.4)
SODIUM SERPL-SCNC: 133 MMOL/L (ref 135–145)
WBC # BLD AUTO: 12 K/UL (ref 4.8–10.8)

## 2024-01-14 PROCEDURE — 36415 COLL VENOUS BLD VENIPUNCTURE: CPT

## 2024-01-14 PROCEDURE — A9270 NON-COVERED ITEM OR SERVICE: HCPCS | Performed by: INTERNAL MEDICINE

## 2024-01-14 PROCEDURE — 99232 SBSQ HOSP IP/OBS MODERATE 35: CPT | Performed by: INTERNAL MEDICINE

## 2024-01-14 PROCEDURE — 700111 HCHG RX REV CODE 636 W/ 250 OVERRIDE (IP): Mod: JZ | Performed by: INTERNAL MEDICINE

## 2024-01-14 PROCEDURE — 85027 COMPLETE CBC AUTOMATED: CPT

## 2024-01-14 PROCEDURE — 700102 HCHG RX REV CODE 250 W/ 637 OVERRIDE(OP): Performed by: INTERNAL MEDICINE

## 2024-01-14 PROCEDURE — 770001 HCHG ROOM/CARE - MED/SURG/GYN PRIV*

## 2024-01-14 PROCEDURE — 700111 HCHG RX REV CODE 636 W/ 250 OVERRIDE (IP): Performed by: HOSPITALIST

## 2024-01-14 PROCEDURE — A9270 NON-COVERED ITEM OR SERVICE: HCPCS | Performed by: HOSPITALIST

## 2024-01-14 PROCEDURE — 80069 RENAL FUNCTION PANEL: CPT

## 2024-01-14 PROCEDURE — 700102 HCHG RX REV CODE 250 W/ 637 OVERRIDE(OP): Performed by: HOSPITALIST

## 2024-01-14 PROCEDURE — 700105 HCHG RX REV CODE 258: Performed by: HOSPITALIST

## 2024-01-14 RX ORDER — IPRATROPIUM BROMIDE AND ALBUTEROL SULFATE 2.5; .5 MG/3ML; MG/3ML
3 SOLUTION RESPIRATORY (INHALATION)
Status: DISCONTINUED | OUTPATIENT
Start: 2024-01-14 | End: 2024-01-19 | Stop reason: HOSPADM

## 2024-01-14 RX ADMIN — NICOTINE 14 MG: 14 PATCH TRANSDERMAL at 04:03

## 2024-01-14 RX ADMIN — ALBUTEROL SULFATE 2 PUFF: 90 AEROSOL, METERED RESPIRATORY (INHALATION) at 15:28

## 2024-01-14 RX ADMIN — OXYCODONE 5 MG: 5 TABLET ORAL at 04:03

## 2024-01-14 RX ADMIN — ENOXAPARIN SODIUM 40 MG: 100 INJECTION SUBCUTANEOUS at 17:13

## 2024-01-14 RX ADMIN — OXYCODONE 5 MG: 5 TABLET ORAL at 07:04

## 2024-01-14 RX ADMIN — OXYCODONE 5 MG: 5 TABLET ORAL at 13:47

## 2024-01-14 RX ADMIN — CYCLOBENZAPRINE 5 MG: 10 TABLET, FILM COATED ORAL at 09:54

## 2024-01-14 RX ADMIN — OXYCODONE 5 MG: 5 TABLET ORAL at 23:18

## 2024-01-14 RX ADMIN — DOCUSATE SODIUM 50 MG AND SENNOSIDES 8.6 MG 2 TABLET: 8.6; 5 TABLET, FILM COATED ORAL at 17:13

## 2024-01-14 RX ADMIN — OMEPRAZOLE 40 MG: 20 CAPSULE, DELAYED RELEASE ORAL at 04:03

## 2024-01-14 RX ADMIN — OXYCODONE 5 MG: 5 TABLET ORAL at 18:17

## 2024-01-14 RX ADMIN — DOCUSATE SODIUM 50 MG AND SENNOSIDES 8.6 MG 2 TABLET: 8.6; 5 TABLET, FILM COATED ORAL at 04:03

## 2024-01-14 RX ADMIN — HYDROMORPHONE HYDROCHLORIDE 0.5 MG: 1 INJECTION, SOLUTION INTRAMUSCULAR; INTRAVENOUS; SUBCUTANEOUS at 01:48

## 2024-01-14 RX ADMIN — FLUOXETINE HYDROCHLORIDE 40 MG: 20 CAPSULE ORAL at 04:03

## 2024-01-14 RX ADMIN — SODIUM CHLORIDE: 9 INJECTION, SOLUTION INTRAVENOUS at 15:31

## 2024-01-14 RX ADMIN — FLUOXETINE HYDROCHLORIDE 40 MG: 20 CAPSULE ORAL at 17:13

## 2024-01-14 RX ADMIN — OMEPRAZOLE 40 MG: 20 CAPSULE, DELAYED RELEASE ORAL at 17:13

## 2024-01-14 RX ADMIN — OXYCODONE 5 MG: 5 TABLET ORAL at 10:39

## 2024-01-14 RX ADMIN — DIAZEPAM 5 MG: 5 TABLET ORAL at 17:13

## 2024-01-14 RX ADMIN — ALPRAZOLAM 0.5 MG: 0.5 TABLET ORAL at 19:56

## 2024-01-14 RX ADMIN — SODIUM CHLORIDE: 9 INJECTION, SOLUTION INTRAVENOUS at 02:27

## 2024-01-14 ASSESSMENT — PAIN DESCRIPTION - PAIN TYPE
TYPE: ACUTE PAIN;SURGICAL PAIN
TYPE: SURGICAL PAIN

## 2024-01-14 ASSESSMENT — ENCOUNTER SYMPTOMS
LOSS OF CONSCIOUSNESS: 0
INSOMNIA: 0
SEIZURES: 0
CHILLS: 0
FALLS: 1
EYE PAIN: 0
FOCAL WEAKNESS: 0
COUGH: 0
EYE REDNESS: 0
MYALGIAS: 1
WEAKNESS: 0
PALPITATIONS: 0
FEVER: 0
TREMORS: 0
CONSTIPATION: 0
DIARRHEA: 0
VOMITING: 0
HEMOPTYSIS: 0
HEADACHES: 0
DIZZINESS: 0
SHORTNESS OF BREATH: 0
WHEEZING: 0
ABDOMINAL PAIN: 0
BLOOD IN STOOL: 0
NERVOUS/ANXIOUS: 0
NAUSEA: 0

## 2024-01-14 NOTE — CARE PLAN
The patient is Stable - Low risk of patient condition declining or worsening    Shift Goals  Clinical Goals: safety  Patient Goals: pain control    Progress made toward(s) clinical / shift goals:  Down to 2L O2 via NC. Requiring pain medicine as often as patient can receive patient asks for it. Neuro status unchanged.     Patient is not progressing towards the following goals:      Problem: Knowledge Deficit - Standard  Goal: Patient and family/care givers will demonstrate understanding of plan of care, disease process/condition, diagnostic tests and medications  Outcome: Progressing     Problem: Fall Risk  Goal: Patient will remain free from falls  Outcome: Progressing

## 2024-01-14 NOTE — PROGRESS NOTES
Hospital Medicine Daily Progress Note    Date of Service  1/14/2024    Chief Complaint  Windy León is a 68 y.o. female admitted 1/12/2024 with GLF (MGLF on ice today, denies hitting head / LOC) and Leg Injury (LLE swelling / pain in L thigh s/p GLF today, hx osteoporosis and L hip replacement, +CMS distal to injury)    Hospital Course  No notes on file  See below  Interval Problem Update  I reviewed the chart along with vitals, labs, imaging, test (both pending and resulted) and recommendations from specialists and interdisciplinary team.  68 year old female presented with fall, injuring left leg. Imaging showed left displaced, comminuted supracondylar femur fracture s/p ORIF by Dr. Amezquita, Ortho 1/12. Leukocytosis trending down 15-12.    She was having pain and muscle spasms. Ordred Valium. Pain is now better controlled. Ortho recs: TTWB PT/OT mobilization ASAP.  Mild hyponatremia, monitor.     PT/OT pending.  She prefers to go home rather than facility.     I have discussed this patient's plan of care and discharge plan at IDT rounds today with Case Management, Nursing, Nursing leadership, and other members of the IDT team.    Consultants/Specialty  Orthopedics    Code Status  Full Code    Disposition  Medically ClearedI have placed the appropriate orders for post-discharge needs.    Review of Systems  Review of Systems   Constitutional:  Negative for chills and fever.   HENT:  Negative for congestion, hearing loss and nosebleeds.    Eyes:  Negative for pain and redness.   Respiratory:  Negative for cough, hemoptysis, shortness of breath and wheezing.    Cardiovascular:  Negative for chest pain and palpitations.   Gastrointestinal:  Negative for abdominal pain, blood in stool, constipation, diarrhea, nausea and vomiting.   Genitourinary:  Negative for dysuria, frequency and hematuria.   Musculoskeletal:  Positive for falls, joint pain and myalgias.   Skin:  Negative for rash.   Neurological:  Negative for  dizziness, tremors, focal weakness, seizures, loss of consciousness, weakness and headaches.   Psychiatric/Behavioral:  The patient is not nervous/anxious and does not have insomnia.    All other systems reviewed and are negative.       Physical Exam  Temp:  [36.6 °C (97.9 °F)-36.7 °C (98.1 °F)] 36.6 °C (97.9 °F)  Pulse:  [100-109] 107  Resp:  [17-18] 17  BP: (126-141)/(55-83) 141/83  SpO2:  [90 %-92 %] 90 %    Physical Exam  Vitals and nursing note reviewed.   Constitutional:       General: She is not in acute distress.  HENT:      Head: Normocephalic and atraumatic.      Right Ear: External ear normal.      Left Ear: External ear normal.      Nose: Nose normal.      Mouth/Throat:      Mouth: Mucous membranes are moist.   Eyes:      General: No scleral icterus.     Conjunctiva/sclera: Conjunctivae normal.   Cardiovascular:      Rate and Rhythm: Normal rate and regular rhythm.      Pulses: Normal pulses.      Heart sounds: No murmur heard.     No friction rub. No gallop.   Pulmonary:      Effort: Pulmonary effort is normal. No respiratory distress.      Breath sounds: Normal breath sounds. No stridor. No wheezing, rhonchi or rales.   Chest:      Chest wall: No tenderness.   Abdominal:      General: Abdomen is flat. Bowel sounds are normal. There is no distension.      Palpations: Abdomen is soft.      Tenderness: There is no abdominal tenderness. There is no guarding or rebound.   Musculoskeletal:         General: Tenderness (L leg soreness) present. No swelling or deformity. Normal range of motion.      Cervical back: Normal range of motion and neck supple. No rigidity.   Skin:     General: Skin is warm.      Coloration: Skin is not jaundiced.   Neurological:      General: No focal deficit present.      Mental Status: She is alert and oriented to person, place, and time. Mental status is at baseline.   Psychiatric:         Mood and Affect: Mood normal.         Behavior: Behavior normal.         Thought Content:  Thought content normal.         Judgment: Judgment normal.         Fluids    Intake/Output Summary (Last 24 hours) at 1/14/2024 1447  Last data filed at 1/14/2024 1136  Gross per 24 hour   Intake 1378 ml   Output 650 ml   Net 728 ml         Laboratory  Recent Labs     01/12/24  0905 01/13/24  0541 01/14/24  0554   WBC 9.1 15.0* 12.0*   RBC 4.79 3.94* 3.32*   HEMOGLOBIN 15.0 12.3 10.3*   HEMATOCRIT 45.5 36.9* 32.1*   MCV 95.0 93.7 96.7   MCH 31.3 31.2 31.0   MCHC 33.0 33.3 32.1*   RDW 43.0 42.6 44.6   PLATELETCT 258 243 191   MPV 10.2 9.8 9.8       Recent Labs     01/12/24  0905 01/13/24  0541 01/14/24  0554   SODIUM 134* 132* 133*   POTASSIUM 4.5 4.3 4.1   CHLORIDE 99 99 101   CO2 27 22 24   GLUCOSE 100* 123* 126*   BUN 15 9 8   CREATININE 0.65 0.41* 0.40*   CALCIUM 9.1 8.4* 8.1*                     Imaging  DX-KNEE 2- LEFT   Final Result         1.  Intraoperative changes of distal femoral ORIF in progress      CT-KNEE W/O PLUS RECONS LEFT   Final Result      Comminuted distal femur fracture. No definite extension to the articular surface.      DX-KNEE 3 VIEWS LEFT   Final Result      1.  Comminuted and impacted fracture of the distal left femur without evidence of articular extension. Persistent posterior angulation post reduction.   2.  Osteopenia.      DX-KNEE 3 VIEWS LEFT   Final Result      1.  Partially visualized, comminuted fracture of the distal left femur without evidence of articular extension.   2.  Osteopenia.      DX-FEMUR-2+ LEFT   Final Result      1.  Severely comminuted fracture of the distal femur. Recommend knee radiographs to more confidently exclude extension to the knee joint.   2.  Prior LEFT hip arthroplasty   3.  Osteopenia   4.  Osteoarthritis      DX-PELVIS-1 OR 2 VIEWS   Final Result      1.  Deformity of the RIGHT pubic bone compatible with age indeterminate trauma. This could be acute or chronic fracture. Comparison with any prior images would be helpful.   2.  Prior LEFT hip  "arthroplasty   3.  Osteopenia   4.  Osteoarthritis      DX-PORTABLE FLUORO > 1 HOUR    (Results Pending)        Assessment/Plan  * Severely comminuted fracture of the left distal femur- (present on admission)  Assessment & Plan  Orthopedics consulted, anticipate need for operative treatment    Multimodal pain control  Consider physical and Occupational Therapy, pharmacologic prophylaxis when okay with orthopedics   Revised Cardiac Risk Index for Pre-Operative Risk score of:   2 points Class III Risk  The patient has a 10.1 % 30-day risk of death, MI, or cardiac arrest   The patient has an elevated BMI of 29.  She is at higher risk for postoperative hypoxia. I will order continuous pulse oximetry  Emphasized incentive spirometry, consider BiPAP/CPAP postprocedure    S/p ORIF 1/12.   Wean off O2.  Ordered antispasmodic. Monitor mentation/sedation score, respirations blood ressure if on narcotics.  PT/OT    Dehydration- (present on admission)  Assessment & Plan  Likely secondary to reduced oral intake.   Encourage oral intake as tolerated, antiemetics as needed.  Intravenous hydration until adequate oral intake is achieved.     Hyponatremia- (present on admission)  Assessment & Plan  Hypovolemic hyponatremia  I expect Na to improve with IVF     Anxiety- (present on admission)  Assessment & Plan  Resume fluoxetine   As needed alprazolam    BMI 29.0-29.9,adult- (present on admission)  Assessment & Plan  The patient has an elevated BMI of 29.  She is at higher risk for postoperative hypoxia. I will order continuous pulse oximetry  Emphasized incentive spirometry, consider BiPAP/CPAP postprocedure  \"    Recommended weight loss advised, 5% through reduced calorie, low carb diet and 150 mins of exercise a week once better  Recommend bariatric surgery evaluation if morbidly obese  Educated on the increase of morbidity and mortality associated with excess weight including DM, Heart Disease, HTN, stroke, and sleep apnea. " Recommended outpatient monitoring  of blood sugars, lipid panel, sleep study evaluation for metabolic syndrome.        Hypertensive urgency- (present on admission)  Assessment & Plan  Likely secondary to severe pain  I will place on multimodal pain medications   I will start as needed labetalol for extreme hypertension  Consider starting scheduled antihypertensive medications according to blood pressure trend  Vitals:    01/14/24 0731   BP: (!) 141/83   Pulse: (!) 107   Resp: 17   Temp: 36.6 °C (97.9 °F)   SpO2: 90%     Stable    Tobacco dependence- (present on admission)  Assessment & Plan  I spent 4 minutes on Tobacco cessation education and counseling.   I Discussed the benefits of quitting smoking and risks of continued smoking including cardiovascular disease, cancer and COPD.   Discussed the option of nicotine patch.          VTE prophylaxis: Ordered Lovenox ppx    I have performed a physical exam and reviewed and updated ROS and Plan today (1/14/2024). In review of yesterday's note (1/13/2024), there are no changes except as documented above.

## 2024-01-14 NOTE — CARE PLAN
The patient is Stable - Low risk of patient condition declining or worsening    Shift Goals  Clinical Goals: Pain control, Safety  Patient Goals: Pain control  Family Goals: Not present    Progress made toward(s) clinical / shift goals:    Problem: Pain - Standard  Goal: Alleviation of pain or a reduction in pain to the patient’s comfort goal  Outcome: Progressing  Note: Patient educated on pain scale and to notify RN of pain medicated per MAR and repositioned       Problem: Knowledge Deficit - Standard  Goal: Patient and family/care givers will demonstrate understanding of plan of care, disease process/condition, diagnostic tests and medications  Outcome: Progressing  Note: Plan of care discussed, verbalized understanding. Questions answered        Problem: Skin Integrity  Goal: Skin integrity is maintained or improved  Outcome: Progressing     Problem: Fall Risk  Goal: Patient will remain free from falls  Outcome: Progressing       Patient is not progressing towards the following goals:

## 2024-01-14 NOTE — CARE PLAN
Problem: Pain - Standard  Goal: Alleviation of pain or a reduction in pain to the patient’s comfort goal  Outcome: Progressing  Note: Pt states pain 9/10. Medications administered per MAR, ice pack applied, pillows for comfort and repositioning.      Problem: Knowledge Deficit - Standard  Goal: Patient and family/care givers will demonstrate understanding of plan of care, disease process/condition, diagnostic tests and medications  Outcome: Progressing  Note: POC discussed with pt at bedside. Pt asks appropriate questions, no additional concerns at this time.    The patient is Stable - Low risk of patient condition declining or worsening    Shift Goals  Clinical Goals: pain control, mobility  Patient Goals: pain control, rest  Family Goals: n/a    Progress made toward(s) clinical / shift goals:      Patient is not progressing towards the following goals:

## 2024-01-14 NOTE — PROGRESS NOTES
Hospital Medicine Daily Progress Note    Date of Service  1/13/2024    Chief Complaint  Windy León is a 68 y.o. female admitted 1/12/2024 with GLF (MGLF on ice today, denies hitting head / LOC) and Leg Injury (LLE swelling / pain in L thigh s/p GLF today, hx osteoporosis and L hip replacement, +CMS distal to injury)    Hospital Course  No notes on file  See below  Interval Problem Update  I reviewed the chart along with vitals, labs, imaging, test (both pending and resulted) and recommendations from specialists and interdisciplinary team.  68 year old female presented with fall, injuring left leg. Imaging showed left displaced, comminuted supracondylar femur fracture s/p ORIF by Dr. Amezquita, Ortho 1/12.    She was having pain and muscle spasms. Ordred Valium. Ortho recs: TTWB PT/OT mobilization ASAP  PT/OT pending    I have discussed this patient's plan of care and discharge plan at IDT rounds today with Case Management, Nursing, Nursing leadership, and other members of the IDT team.    Consultants/Specialty  Orthopedics    Code Status  Full Code    Disposition  The patient is medically cleared for discharge to home or a post-acute facility.    I have placed the appropriate orders for post-discharge needs.    Review of Systems  Review of Systems   Constitutional:  Negative for chills and fever.   HENT:  Negative for congestion, hearing loss and nosebleeds.    Eyes:  Negative for pain and redness.   Respiratory:  Negative for cough, hemoptysis, shortness of breath and wheezing.    Cardiovascular:  Negative for chest pain and palpitations.   Gastrointestinal:  Negative for abdominal pain, blood in stool, constipation, diarrhea, nausea and vomiting.   Genitourinary:  Negative for dysuria, frequency and hematuria.   Musculoskeletal:  Positive for falls, joint pain and myalgias.   Skin:  Negative for rash.   Neurological:  Negative for dizziness, tremors, focal weakness, seizures, loss of consciousness, weakness and  headaches.   Psychiatric/Behavioral:  The patient is not nervous/anxious and does not have insomnia.    All other systems reviewed and are negative.       Physical Exam  Temp:  [36.2 °C (97.2 °F)-37.6 °C (99.6 °F)] 36.6 °C (97.9 °F)  Pulse:  [] 100  Resp:  [12-23] 18  BP: (105-181)/(55-85) 126/55  SpO2:  [90 %-99 %] 90 %    Physical Exam    Fluids    Intake/Output Summary (Last 24 hours) at 1/13/2024 1731  Last data filed at 1/13/2024 0930  Gross per 24 hour   Intake 1420 ml   Output --   Net 1420 ml       Laboratory  Recent Labs     01/12/24  0905 01/13/24  0541   WBC 9.1 15.0*   RBC 4.79 3.94*   HEMOGLOBIN 15.0 12.3   HEMATOCRIT 45.5 36.9*   MCV 95.0 93.7   MCH 31.3 31.2   MCHC 33.0 33.3   RDW 43.0 42.6   PLATELETCT 258 243   MPV 10.2 9.8     Recent Labs     01/12/24  0905 01/13/24  0541   SODIUM 134* 132*   POTASSIUM 4.5 4.3   CHLORIDE 99 99   CO2 27 22   GLUCOSE 100* 123*   BUN 15 9   CREATININE 0.65 0.41*   CALCIUM 9.1 8.4*                   Imaging  DX-KNEE 2- LEFT   Final Result         1.  Intraoperative changes of distal femoral ORIF in progress      CT-KNEE W/O PLUS RECONS LEFT   Final Result      Comminuted distal femur fracture. No definite extension to the articular surface.      DX-KNEE 3 VIEWS LEFT   Final Result      1.  Comminuted and impacted fracture of the distal left femur without evidence of articular extension. Persistent posterior angulation post reduction.   2.  Osteopenia.      DX-KNEE 3 VIEWS LEFT   Final Result      1.  Partially visualized, comminuted fracture of the distal left femur without evidence of articular extension.   2.  Osteopenia.      DX-FEMUR-2+ LEFT   Final Result      1.  Severely comminuted fracture of the distal femur. Recommend knee radiographs to more confidently exclude extension to the knee joint.   2.  Prior LEFT hip arthroplasty   3.  Osteopenia   4.  Osteoarthritis      DX-PELVIS-1 OR 2 VIEWS   Final Result      1.  Deformity of the RIGHT pubic bone  "compatible with age indeterminate trauma. This could be acute or chronic fracture. Comparison with any prior images would be helpful.   2.  Prior LEFT hip arthroplasty   3.  Osteopenia   4.  Osteoarthritis      DX-PORTABLE FLUORO > 1 HOUR    (Results Pending)        Assessment/Plan  * Severely comminuted fracture of the left distal femur- (present on admission)  Assessment & Plan  Orthopedics consulted, anticipate need for operative treatment    Multimodal pain control  Consider physical and Occupational Therapy, pharmacologic prophylaxis when okay with orthopedics   Revised Cardiac Risk Index for Pre-Operative Risk score of:   2 points Class III Risk  The patient has a 10.1 % 30-day risk of death, MI, or cardiac arrest   The patient has an elevated BMI of 29.  She is at higher risk for postoperative hypoxia. I will order continuous pulse oximetry  Emphasized incentive spirometry, consider BiPAP/CPAP postprocedure    S/p ORIF 1/12.   Wean off O2.  Ordered antispasmodic. Monitor mentation/sedation score, respirations blood ressure if on narcotics.  PT/OT    Dehydration- (present on admission)  Assessment & Plan  Likely secondary to reduced oral intake.   Encourage oral intake as tolerated, antiemetics as needed.  Intravenous hydration until adequate oral intake is achieved.     Hyponatremia- (present on admission)  Assessment & Plan  Hypovolemic hyponatremia  I expect Na to improve with IVF     Anxiety- (present on admission)  Assessment & Plan  Resume fluoxetine   As needed alprazolam    BMI 29.0-29.9,adult- (present on admission)  Assessment & Plan  The patient has an elevated BMI of 29.  She is at higher risk for postoperative hypoxia. I will order continuous pulse oximetry  Emphasized incentive spirometry, consider BiPAP/CPAP postprocedure  \"    Recommended weight loss advised, 5% through reduced calorie, low carb diet and 150 mins of exercise a week once better  Recommend bariatric surgery evaluation if morbidly " obese  Educated on the increase of morbidity and mortality associated with excess weight including DM, Heart Disease, HTN, stroke, and sleep apnea. Recommended outpatient monitoring  of blood sugars, lipid panel, sleep study evaluation for metabolic syndrome.        Hypertensive urgency- (present on admission)  Assessment & Plan  Likely secondary to severe pain  I will place on multimodal pain medications   I will start as needed labetalol for extreme hypertension  Consider starting scheduled antihypertensive medications according to blood pressure trend  Vitals:    01/13/24 1510   BP: 126/55   Pulse: 100   Resp: 18   Temp: 36.6 °C (97.9 °F)   SpO2: 90%     Stable    Tobacco dependence- (present on admission)  Assessment & Plan  I spent 4 minutes on Tobacco cessation education and counseling.   I Discussed the benefits of quitting smoking and risks of continued smoking including cardiovascular disease, cancer and COPD.   Discussed the option of nicotine patch.          VTE prophylaxis: Ordered Lovenox ppx    I have performed a physical exam and reviewed and updated ROS and Plan today (1/13/2024). In review of yesterday's note (1/12/2024), there are no changes except as documented above.

## 2024-01-15 LAB
ALBUMIN SERPL BCP-MCNC: 2.9 G/DL (ref 3.2–4.9)
BUN SERPL-MCNC: 6 MG/DL (ref 8–22)
CALCIUM ALBUM COR SERPL-MCNC: 9.1 MG/DL (ref 8.5–10.5)
CALCIUM SERPL-MCNC: 8.2 MG/DL (ref 8.5–10.5)
CHLORIDE SERPL-SCNC: 101 MMOL/L (ref 96–112)
CO2 SERPL-SCNC: 27 MMOL/L (ref 20–33)
CREAT SERPL-MCNC: 0.3 MG/DL (ref 0.5–1.4)
ERYTHROCYTE [DISTWIDTH] IN BLOOD BY AUTOMATED COUNT: 41.4 FL (ref 35.9–50)
GFR SERPLBLD CREATININE-BSD FMLA CKD-EPI: 114 ML/MIN/1.73 M 2
GLUCOSE SERPL-MCNC: 116 MG/DL (ref 65–99)
HCT VFR BLD AUTO: 27.9 % (ref 37–47)
HGB BLD-MCNC: 9.4 G/DL (ref 12–16)
MCH RBC QN AUTO: 31.6 PG (ref 27–33)
MCHC RBC AUTO-ENTMCNC: 33.7 G/DL (ref 32.2–35.5)
MCV RBC AUTO: 93.9 FL (ref 81.4–97.8)
PHOSPHATE SERPL-MCNC: 2.7 MG/DL (ref 2.5–4.5)
PLATELET # BLD AUTO: 194 K/UL (ref 164–446)
PMV BLD AUTO: 9.9 FL (ref 9–12.9)
POTASSIUM SERPL-SCNC: 3.3 MMOL/L (ref 3.6–5.5)
RBC # BLD AUTO: 2.97 M/UL (ref 4.2–5.4)
SODIUM SERPL-SCNC: 135 MMOL/L (ref 135–145)
WBC # BLD AUTO: 10.8 K/UL (ref 4.8–10.8)

## 2024-01-15 PROCEDURE — 770001 HCHG ROOM/CARE - MED/SURG/GYN PRIV*

## 2024-01-15 PROCEDURE — 97535 SELF CARE MNGMENT TRAINING: CPT

## 2024-01-15 PROCEDURE — 700111 HCHG RX REV CODE 636 W/ 250 OVERRIDE (IP): Mod: JZ | Performed by: INTERNAL MEDICINE

## 2024-01-15 PROCEDURE — 700105 HCHG RX REV CODE 258: Performed by: HOSPITALIST

## 2024-01-15 PROCEDURE — 700102 HCHG RX REV CODE 250 W/ 637 OVERRIDE(OP): Performed by: HOSPITALIST

## 2024-01-15 PROCEDURE — 80069 RENAL FUNCTION PANEL: CPT

## 2024-01-15 PROCEDURE — 97530 THERAPEUTIC ACTIVITIES: CPT

## 2024-01-15 PROCEDURE — 85027 COMPLETE CBC AUTOMATED: CPT

## 2024-01-15 PROCEDURE — 97166 OT EVAL MOD COMPLEX 45 MIN: CPT

## 2024-01-15 PROCEDURE — 36415 COLL VENOUS BLD VENIPUNCTURE: CPT

## 2024-01-15 PROCEDURE — 99232 SBSQ HOSP IP/OBS MODERATE 35: CPT | Performed by: INTERNAL MEDICINE

## 2024-01-15 PROCEDURE — 700111 HCHG RX REV CODE 636 W/ 250 OVERRIDE (IP): Performed by: HOSPITALIST

## 2024-01-15 PROCEDURE — A9270 NON-COVERED ITEM OR SERVICE: HCPCS | Performed by: HOSPITALIST

## 2024-01-15 RX ADMIN — OMEPRAZOLE 40 MG: 20 CAPSULE, DELAYED RELEASE ORAL at 04:08

## 2024-01-15 RX ADMIN — OXYCODONE 5 MG: 5 TABLET ORAL at 05:46

## 2024-01-15 RX ADMIN — FLUOXETINE HYDROCHLORIDE 40 MG: 20 CAPSULE ORAL at 16:37

## 2024-01-15 RX ADMIN — OMEPRAZOLE 40 MG: 20 CAPSULE, DELAYED RELEASE ORAL at 16:37

## 2024-01-15 RX ADMIN — OXYCODONE 5 MG: 5 TABLET ORAL at 09:53

## 2024-01-15 RX ADMIN — NICOTINE 14 MG: 14 PATCH TRANSDERMAL at 04:06

## 2024-01-15 RX ADMIN — ALBUTEROL SULFATE 2 PUFF: 90 AEROSOL, METERED RESPIRATORY (INHALATION) at 15:48

## 2024-01-15 RX ADMIN — ENOXAPARIN SODIUM 40 MG: 100 INJECTION SUBCUTANEOUS at 16:38

## 2024-01-15 RX ADMIN — ALPRAZOLAM 0.5 MG: 0.5 TABLET ORAL at 22:07

## 2024-01-15 RX ADMIN — FLUOXETINE HYDROCHLORIDE 40 MG: 20 CAPSULE ORAL at 04:07

## 2024-01-15 RX ADMIN — OXYCODONE 5 MG: 5 TABLET ORAL at 15:57

## 2024-01-15 RX ADMIN — SODIUM CHLORIDE: 9 INJECTION, SOLUTION INTRAVENOUS at 03:31

## 2024-01-15 RX ADMIN — OXYCODONE 5 MG: 5 TABLET ORAL at 23:15

## 2024-01-15 RX ADMIN — DOCUSATE SODIUM 50 MG AND SENNOSIDES 8.6 MG 2 TABLET: 8.6; 5 TABLET, FILM COATED ORAL at 04:07

## 2024-01-15 RX ADMIN — HYDROMORPHONE HYDROCHLORIDE 0.5 MG: 1 INJECTION, SOLUTION INTRAMUSCULAR; INTRAVENOUS; SUBCUTANEOUS at 19:20

## 2024-01-15 RX ADMIN — OXYCODONE 5 MG: 5 TABLET ORAL at 13:00

## 2024-01-15 ASSESSMENT — PAIN DESCRIPTION - PAIN TYPE
TYPE: SURGICAL PAIN
TYPE: ACUTE PAIN
TYPE: ACUTE PAIN
TYPE: SURGICAL PAIN;ACUTE PAIN
TYPE: ACUTE PAIN
TYPE: SURGICAL PAIN
TYPE: ACUTE PAIN
TYPE: ACUTE PAIN;SURGICAL PAIN
TYPE: ACUTE PAIN

## 2024-01-15 ASSESSMENT — COGNITIVE AND FUNCTIONAL STATUS - GENERAL
HELP NEEDED FOR BATHING: A LOT
MOBILITY SCORE: 9
WALKING IN HOSPITAL ROOM: A LOT
STANDING UP FROM CHAIR USING ARMS: A LOT
CLIMB 3 TO 5 STEPS WITH RAILING: TOTAL
SUGGESTED CMS G CODE MODIFIER DAILY ACTIVITY: CK
DRESSING REGULAR UPPER BODY CLOTHING: A LITTLE
SUGGESTED CMS G CODE MODIFIER MOBILITY: CM
TURNING FROM BACK TO SIDE WHILE IN FLAT BAD: A LOT
TOILETING: A LOT
MOVING TO AND FROM BED TO CHAIR: UNABLE
PERSONAL GROOMING: A LITTLE
DRESSING REGULAR LOWER BODY CLOTHING: A LOT
DAILY ACTIVITIY SCORE: 16
MOVING FROM LYING ON BACK TO SITTING ON SIDE OF FLAT BED: UNABLE

## 2024-01-15 ASSESSMENT — ENCOUNTER SYMPTOMS
FOCAL WEAKNESS: 0
DIARRHEA: 0
SEIZURES: 0
LOSS OF CONSCIOUSNESS: 0
VOMITING: 0
NAUSEA: 0
HEMOPTYSIS: 0
CONSTIPATION: 0
TREMORS: 0
DIZZINESS: 0
CHILLS: 0
FALLS: 1
COUGH: 0
PALPITATIONS: 0
EYE PAIN: 0
SHORTNESS OF BREATH: 0
HEADACHES: 0
WEAKNESS: 0
BLOOD IN STOOL: 0
NERVOUS/ANXIOUS: 0
ABDOMINAL PAIN: 0
MYALGIAS: 1
EYE REDNESS: 0
INSOMNIA: 0
WHEEZING: 0
FEVER: 0

## 2024-01-15 ASSESSMENT — ACTIVITIES OF DAILY LIVING (ADL): TOILETING: INDEPENDENT

## 2024-01-15 ASSESSMENT — GAIT ASSESSMENTS: GAIT LEVEL OF ASSIST: UNABLE TO PARTICIPATE

## 2024-01-15 NOTE — CARE PLAN
The patient is Stable - Low risk of patient condition declining or worsening    Shift Goals  Clinical Goals: pain control, safety  Patient Goals: rest, comfort  Family Goals: no family present    Progress made toward(s) clinical / shift goals:    Problem: Pain - Standard  Goal: Alleviation of pain or a reduction in pain to the patient’s comfort goal  1/14/2024 2145 by Joseph Thompson R.N.  Outcome: Progressing    Administer pain medications per MAR.     Problem: Knowledge Deficit - Standard  Goal: Patient and family/care givers will demonstrate understanding of plan of care, disease process/condition, diagnostic tests and medications  1/14/2024 2145 by Joseph Thompson R.N.  Outcome: Progressing    Educate the patient regarding her plan of care.     Problem: Fall Risk  Goal: Patient will remain free from falls  1/14/2024 2145 by Joseph Thompson R.N.  Outcome: Progressing    Kept side rails up and bed to its lowest position. Kept call bell within reach.        Patient is not progressing towards the following goals:

## 2024-01-15 NOTE — PROGRESS NOTES
Pt is oriented x 4 without complaints.  Surgical dressing noted to LLE.  Pt denies needs at this time.  Call light within reach.

## 2024-01-15 NOTE — DISCHARGE PLANNING
[facility] Transitional Care Coordination    Referral from:  [facility]  Insurance Provider on Facesheet:[employer] KATERINA  Potential Rehab Diagnosis: L femur Fx    Chart review indicates patient may have on going medical management and may have therapy needs to possibly meet inpatient rehab facility criteria with the goal of returning to community.    D/C support: non listed     Physiatry consultation pended per protocol.   Will need therapy evaluations when medically appropriate         Thank you for the referral.

## 2024-01-15 NOTE — CARE PLAN
Problem: Pain - Standard  Goal: Alleviation of pain or a reduction in pain to the patient’s comfort goal  Outcome: Progressing     Problem: Knowledge Deficit - Standard  Goal: Patient and family/care givers will demonstrate understanding of plan of care, disease process/condition, diagnostic tests and medications  Outcome: Progressing   The patient is Stable - Low risk of patient condition declining or worsening    Shift Goals  Clinical Goals: pain control, comfort  Patient Goals: pain control, safety  Family Goals: no family present    Progress made toward(s) clinical / shift goals:  medicated for pain.  Pt updated on POC for today.    Patient is not progressing towards the following goals:

## 2024-01-15 NOTE — DISCHARGE PLANNING
"Case Management Discharge Planning    Admission Date: 1/12/2024  GMLOS: 3.2  ALOS: 3    6-Clicks ADL Score: 16  6-Clicks Mobility Score: 9  PT and/or OT Eval ordered: Yes  Post-acute Referrals Ordered: Yes  Post-acute Choice Obtained: No  Has referral(s) been sent to post-acute provider:  Yes      Anticipated Discharge Dispo: Discharge Disposition: D/T to home under HHA care in anticipation of covered skilled care (06)  Discharge Address: [address] [place] [address] [place] [address]    DME Needed: No    Action(s) Taken: DC Assessment Complete (See below)    LSW met with patient at bedside to complete dc assessment. Patient presented as alert as evidenced by her ability to follow conversation regarding dc plan. Patient verified demographic information on facesheet. Patient reported to live alone in mobile home with four steps to enter. Patient reported to be independent with ADLs/IADLs prior to admission. Patient reported to have the following DME : wheelchair, walker, and crutches.  Patient is currently on 2LNC. Patient reported being on RA at baseline. Patient reported a hx of serotonin syndrome and takes Prozac to manage her symptoms. Patient denied a hx of substance abuse.      LSW discussed dc recommendations (post-acute placement). Patient politely declined and stated \"I feel like I'm in a FCI\". LSW discussed home health as an alternative option and advised that HH will not provide as much supervision as a SNF/Rehab. Patient reported to have strong social support consisting of friends Kelsea, Yonatan, and Nidia who will be checking on patient daily and providing assistance with ADLs/IADLs.     MD and Charge RN notified. Plan of care ongoing.       Escalations Completed: None    Medically Clear: No    Next Steps: See above     Barriers to Discharge: Medical clearance    Is the patient up for discharge tomorrow: No      Care Transition Team Assessment    Information Source  Orientation Level: Oriented X4  Information Given By: " Patient  Who is responsible for making decisions for patient? : Patient    Readmission Evaluation  Is this a readmission?: No    Elopement Risk  Legal Hold: No  Ambulatory or Self Mobile in Wheelchair: No-Not an Elopement Risk  Elopement Risk: Not at Risk for Elopement    Interdisciplinary Discharge Planning  Lives with - Patient's Self Care Capacity: Alone and Able to Care For Self  Housing / Facility: Mobile Home  Prior Services: Home-Independent    Discharge Preparedness  What is your plan after discharge?: Skilled nursing facility (Recommendations (SNF) patient declined)  What are your discharge supports?: Other (comment) (friends Kelsea, Yonatan, and Nidia)  Prior Functional Level: Ambulatory, Drives Self, Independent with Activities of Daily Living, Independent with Medication Management  Difficulity with ADLs: None  Difficulity with IADLs: None    Functional Assesment  Prior Functional Level: Ambulatory, Drives Self, Independent with Activities of Daily Living, Independent with Medication Management    Finances  Financial Barriers to Discharge: No  Prescription Coverage: Yes    Vision / Hearing Impairment  Right Eye Vision: Impaired, Wears Glasses  Left Eye Vision: Impaired, Wears Glasses         Advance Directive  Advance Directive?: None    Domestic Abuse  Have you ever been the victim of abuse or violence?: No    Psychological Assessment  History of Substance Abuse: None  History of Psychiatric Problems: No (Patient reported a hx of serotonin syndrome and takes prozac to manage symptoms)  Non-compliant with Treatment: No  Newly Diagnosed Illness: No    Discharge Risks or Barriers  Discharge risks or barriers?: No    Anticipated Discharge Information  Discharge Disposition: D/T to home under A care in anticipation of covered skilled care (06)  Discharge Address: [address] [place] [address] [place] [address]

## 2024-01-15 NOTE — THERAPY
Occupational Therapy   Initial Evaluation     Patient Name: Windy León  Age:  68 y.o., Sex:  female  Medical Record #: [id number]  Today's Date: 1/15/2024    Precautions: Fall Risk, Toe Touch Weight Bearing Left Lower Extremity    Assessment  Patient is 68 y.o. female admitted after a GLF which resulted in a left comminuted displaced supracondylar femur fx. Pt seen s/p ORIF without intercondylar extension. PMHx of osteoporosis and L hip replacement. Pt seen for OT eval and tx. Pt currently resides alone in a mobile home and was independent with ADLs and IADLs.    During OT eval, pt primarily limited by pain, lightheadedness, balance impairments, and decreased activity tolerance causing her to require mod- max A to complete most ADLs and txfs using FWW. Pt demo difficulty with maintaining TTWB during STS and txfs requiring increased VCs. Pt provide education on WB status, transfers, and importance of OOB ADLs to reduce risk of deconditioning. Recommend post-acute placement prior to DC home. Will continue to follow for ongoing acute OT services.     Plan    Occupational Therapy Initial Treatment Plan   Treatment Interventions: Self Care / Activities of Daily Living, Adaptive Equipment, Therapeutic Exercises, Therapeutic Activity  Treatment Frequency: 5 Times per Week  Duration: Until Therapy Goals Met    DC Equipment Recommendations: Unable to determine at this time  Discharge Recommendations: Recommend post-acute placement for additional occupational therapy services prior to discharge home     Objective      Prior Living Situation   Prior Services Home-Independent   Housing / Facility Mobile Home   Steps Into Home 5   Steps In Home 0   Rail Both Rail (Steps into Home)   Bathroom Set up Walk In Shower;Shower Chair   Equipment Owned Front-Wheel Walker;Crutches;Wheelchair;Tub / Shower Seat   Lives with - Patient's Self Care Capacity Alone and Able to Care For Self   Comments Pt currently resides alone, but reports that  her friend lives around the corner and can provide assist in the mornings, during her lunch break, and in the evenings   Prior Level of ADL Function   Self Feeding Independent   Grooming / Hygiene Independent   Bathing Independent   Dressing Independent   Toileting Independent   Prior Level of IADL Function   Medication Management Independent   Laundry Independent   Kitchen Mobility Independent   Finances Independent   Home Management Independent   Shopping Independent   Prior Level Of Mobility Independent Without Device in Community;Independent Without Device in Home   Occupation Employed Full Time  ( at [employer])    Driving / Transportation Driving Independent   History of Falls   History of Falls Yes   Date of Last Fall   (Reason for admit)   Precautions   Precautions Fall Risk;Toe Touch Weight Bearing Left Lower Extremity   Vitals   O2 (LPM) 2   O2 Delivery Device Nasal Cannula   Vitals Comments Following transition to sitting EOB, pt reported feeling light headed. BP was 169/82. Symptoms begun to resolve with time. Following transition to standing pt's BP was 139/59 with  pt feeling asymptomatic   Pain 0 - 10 Group   Therapist Pain Assessment Post Activity Pain Same as Prior to Activity;Nurse Notified  (Not rated, reported an increase in LLE pain following activity. Requested pain meds at termination of session. RN notified)   Cognition    Cognition / Consciousness WDL   Level of Consciousness Alert   Comments Very pleasant, cooperative, receptive to education.   Active ROM Upper Body   Active ROM Upper Body  WDL   Dominant Hand Right   Strength Upper Body   Upper Body Strength  X   Gross Strength Generalized Weakness, Equal Bilaterally.    Balance Assessment   Sitting Balance (Static) Fair   Sitting Balance (Dynamic) Fair -   Standing Balance (Static) Poor   Standing Balance (Dynamic) Poor -   Weight Shift Sitting Poor   Weight Shift Standing Absent   Comments w/FWW;demo difficulty maintaining TTWB    Bed Mobility    Supine to Sit Minimal Assist  (able to use RLE to assist LLE off of bed; required assist ensuring that LLE did not bend as it caused increased pain)   Sit to Supine Moderate Assist  (Required assist with LLE)   Scooting Moderate Assist   Rolling Minimum Assist to Lt.   Comments HOB elevated   ADL Assessment   Eating Modified Independent   Grooming Contact Guard Assist;Seated   Lower Body Dressing Maximal Assist   Toileting Maximal Assist  ([place])   6 Clicks Daily Activity Score 16   Functional Mobility   Sit to Stand Moderate Assist   Bed, Chair, Wheelchair Transfer Moderate Assist   Toilet Transfers   (NT; declined need during session)   Mobility EOB <> chair   Activity Tolerance   Sitting in Chair 3 min   Sitting Edge of Bed 7 min   Standing 2 min   Comments Limited by pain   Patient / Family Goals   Patient / Family Goal #1 To get better   Short Term Goals   Short Term Goal # 1 Pt will complete LB dressing with supv using AE PRN   Short Term Goal # 2 Pt will complete ADL txfs with SBA   Short Term Goal # 3 Pt will complete toileting with hygiene with SBA   Education Group   Education Provided Transfers;Role of Occupational Therapist;Activities of Daily Living;Weight Bearing Precautions;Pathology of bedrest   Role of Occupational Therapist Patient Response Patient;Acceptance;Explanation;Verbal Demonstration   Transfers Patient Response Patient;Acceptance;Explanation;Verbal Demonstration   ADL Patient Response Patient;Acceptance;Explanation;Verbal Demonstration   Weight Bearing Precautions Patient Response Patient;Acceptance;Explanation;Verbal Demonstration   Pathology of Bedrest Patient Response Patient;Acceptance;Explanation;Verbal Demonstration

## 2024-01-15 NOTE — THERAPY
Physical Therapy   Daily Treatment     Patient Name: Windy León  Age:  68 y.o., Sex:  female  Medical Record #: [id number]  Today's Date: 1/15/2024     Precautions  Precautions: Fall Risk;Toe Touch Weight Bearing Left Lower Extremity    Assessment    Pt seen for PT tx session. Stated that she has been slowly working L knee ROM in bed within pain tolerable limits. Pt reported residual LLE pain and fatigue from therapy session earlier today but was agreeable to sit EOB with encouragement. Pt tolerated sitting EOB for 2 min until she scooted to the EOB with L knee at approx 70 deg of flexion, pt immediately reported significant increase in distal quad pain and requested assist BTB. Will follow.    Plan    Treatment Plan Status: Continue Current Treatment Plan  Type of Treatment: Bed Mobility, Gait Training, Equipment, Neuro Re-Education / Balance, Self Care / Home Evaluation, Stair Training, Therapeutic Activities, Therapeutic Exercise  Treatment Frequency: 5 Times per Week  Treatment Duration: Until Therapy Goals Met    DC Equipment Recommendations: Unable to determine at this time  Discharge Recommendations: Recommend post-acute placement for additional physical therapy services prior to discharge home      Subjective    Pt received resting in bed, agreeable to participate.      Objective       01/15/24 1342   Precautions   Precautions Fall Risk;Toe Touch Weight Bearing Left Lower Extremity   Vitals   O2 (LPM) 2   O2 Delivery Device Nasal Cannula   Pain 0 - 10 Group   Therapist Pain Assessment During Activity;Nurse Notified  (c/o increased LLE pain during mobility, RN reported pt was premedicated)   Cognition    Cognition / Consciousness WDL   Level of Consciousness Alert   Comments pleasant and cooperative, pain limited but participatory with encouragement   Balance   Sitting Balance (Static) Fair   Sitting Balance (Dynamic) Fair   Standing Balance (Static)   (NT)   Standing Balance (Dynamic)   (NT)   Weight Shift  Sitting Fair   Weight Shift Standing   (NT)   Skilled Intervention Verbal Cuing   Comments pt politely declined OOB mobility 2/2 pain and fatigue from therapy session earlier today   Bed Mobility    Supine to Sit Minimal Assist   Sit to Supine Minimal Assist   Scooting Contact Guard Assist   Rolling Standby Assist   Skilled Intervention Facilitation;Sequencing;Verbal Cuing   Comments required assist with LLE   Gait Analysis   Gait Level Of Assist Unable to Participate   Comments limited by pain and fatigue from therapy session earlier today   Functional Mobility   Sit to Stand Unable to Participate   Bed, Chair, Wheelchair Transfer Unable to Participate   Mobility sitting EOB only   Comments tolerated sitting EOB for 2 min until pt scooted to the EOB with L knee at approx 70 deg of flexion, pt immediately reported significant increase in distal quad pain and requested assist BTB   How much difficulty does the patient currently have...   Turning over in bed (including adjusting bedclothes, sheets and blankets)? 2   Sitting down on and standing up from a chair with arms (e.g., wheelchair, bedside commode, etc.) 1   Moving from lying on back to sitting on the side of the bed? 1   How much help from another person does the patient currently need...   Moving to and from a bed to a chair (including a wheelchair)? 2   Need to walk in a hospital room? 2   Climbing 3-5 steps with a railing? 1   6 clicks Mobility Score 9   Patient / Family Goals    Patient / Family Goal #1 go home   Goal #1 Outcome Goal not met   Short Term Goals    Short Term Goal # 1 pt will perform supine <> sit with SPV in 6 visits   Goal Outcome # 1 Progressing as expected   Short Term Goal # 2 pt will perform sit < > stand with FWW and CGA in 6 visits   Goal Outcome # 2 Other (see comments)  (not observed this session)   Short Term Goal # 3 pt will complete stand-pivot transfer bed > chair (or wheelchair) with Gina in 6 visits   Goal Outcome # 3 Other  (see comments)  (not observed this session)   Short Term Goal # 4 Pt will tolerate sitting in chair for 1 hour in 6 visits   Goal Outcome # 4 Other (see comments)  (not observed this session)   Education Group   Additional Comments brought recliner into room, encouraged pt to attempt getting into recliner tmrw with therapies as able   Physical Therapy Treatment Plan   Physical Therapy Treatment Plan Continue Current Treatment Plan   Anticipated Discharge Equipment and Recommendations   DC Equipment Recommendations Unable to determine at this time   Discharge Recommendations Recommend post-acute placement for additional physical therapy services prior to discharge home   Interdisciplinary Plan of Care Collaboration   IDT Collaboration with  Nursing;Family / Caregiver   Patient Position at End of Therapy In Bed;Bed Alarm On;Call Light within Reach;Tray Table within Reach;Phone within Reach   Collaboration Comments RN updated, 2 friends at bedside   Session Information   Date / Session Number  1/15 - 2(2/5, 1/19)

## 2024-01-15 NOTE — PROGRESS NOTES
"    Orthopedic PA Progress Note    Interval changes:  Patient doing well. Pain more controlled today and patient appears much more comfortable.   Awaiting PT/OT  LLE dressings are CDI  - May perform knee ROM as tolerated  - Discussed this with patient  TTWB LLE  SCDs to remain on  Cleared for DC from orthopedic standpoint pending therapy recs and medical optimization    ROS - Patient denies any new issues.  Denies any numbness or tingling. Pain well controlled.    BP (!) 140/77   Pulse (!) 104   Temp 36.8 °C (98.2 °F) (Temporal)   Resp 19   Ht 1.6 m (5' 3\")   Wt 75.8 kg (167 lb)   SpO2 96%     Patient seen and examined  No acute distress  Breathing non labored  RRR  LLE: Surgical dressing is clean, dry, and intact. Patient clearly fires tibialis anterior, EHL, and gastrocnemius/soleus. Sensation is intact to light touch throughout superficial peroneal, deep peroneal, tibial, saphenous, and sural nerve distributions. Strong and palpable 2+ dorsalis pedis and posterior tibial pulses with capillary refill less than 2 seconds.     Recent Labs     01/12/24  0905 01/13/24  0541 01/14/24  0554   WBC 9.1 15.0* 12.0*   RBC 4.79 3.94* 3.32*   HEMOGLOBIN 15.0 12.3 10.3*   HEMATOCRIT 45.5 36.9* 32.1*   MCV 95.0 93.7 96.7   MCH 31.3 31.2 31.0   MCHC 33.0 33.3 32.1*   RDW 43.0 42.6 44.6   PLATELETCT 258 243 191   MPV 10.2 9.8 9.8         Active Hospital Problems    Diagnosis     Severely comminuted fracture of the left distal femur [T14.8XXA]     Tobacco dependence [F17.200]     Hypertensive urgency [I16.0]     BMI 29.0-29.9,adult [Z68.29]     Anxiety [F41.9]     Hyponatremia [E87.1]     Dehydration [E86.0]     Gastroesophageal reflux disease [K21.9]        Assessment/Plan:  Patient doing well. Pain more controlled today and patient appears much more comfortable.   Awaiting PT/OT  LLE dressings are CDI  TTWB LLE  SCDs to remain on  Cleared for DC from orthopedic standpoint pending therapy recs and medical " optimization    POD#2 S/p  Open treatment with internal fixation of right   supracondylar femur fracture without intercondylar extension.  Wt bearing status - TTWB LLE  Wound care/Drains - Dressings to be changed every other day by nursing. Or PRN for saturation starting POD#2  Future Procedures - None planned   Lovenox: Start 1/13, Duration-until ambulatory > 150'  Sutures/Staples out- 14-21 days post operatively. Removal will completed by ortho ADONIS's unless transferred.  DVT Prophylaxis outpatient: ASA 81 mg PO BID x4 weeks  PT/OT-initiated  Antibiotics:  Perioperative completed  DVT Prophylaxis- TEDS/SCDs/Foot pumps  Flanagan-not needed per ortho  Case Coordination for Discharge Planning - Disposition per therapy recs.

## 2024-01-16 LAB
ALBUMIN SERPL BCP-MCNC: 3 G/DL (ref 3.2–4.9)
BUN SERPL-MCNC: 8 MG/DL (ref 8–22)
CALCIUM ALBUM COR SERPL-MCNC: 8.9 MG/DL (ref 8.5–10.5)
CALCIUM SERPL-MCNC: 8.1 MG/DL (ref 8.5–10.5)
CHLORIDE SERPL-SCNC: 99 MMOL/L (ref 96–112)
CO2 SERPL-SCNC: 24 MMOL/L (ref 20–33)
CREAT SERPL-MCNC: 0.41 MG/DL (ref 0.5–1.4)
ERYTHROCYTE [DISTWIDTH] IN BLOOD BY AUTOMATED COUNT: 41.1 FL (ref 35.9–50)
GFR SERPLBLD CREATININE-BSD FMLA CKD-EPI: 106 ML/MIN/1.73 M 2
GLUCOSE SERPL-MCNC: 123 MG/DL (ref 65–99)
HCT VFR BLD AUTO: 25.9 % (ref 37–47)
HGB BLD-MCNC: 8.9 G/DL (ref 12–16)
MCH RBC QN AUTO: 32 PG (ref 27–33)
MCHC RBC AUTO-ENTMCNC: 34.4 G/DL (ref 32.2–35.5)
MCV RBC AUTO: 93.2 FL (ref 81.4–97.8)
PHOSPHATE SERPL-MCNC: 2.5 MG/DL (ref 2.5–4.5)
PLATELET # BLD AUTO: 211 K/UL (ref 164–446)
PMV BLD AUTO: 9.7 FL (ref 9–12.9)
POTASSIUM SERPL-SCNC: 3.3 MMOL/L (ref 3.6–5.5)
RBC # BLD AUTO: 2.78 M/UL (ref 4.2–5.4)
SODIUM SERPL-SCNC: 135 MMOL/L (ref 135–145)
WBC # BLD AUTO: 10.7 K/UL (ref 4.8–10.8)

## 2024-01-16 PROCEDURE — 700111 HCHG RX REV CODE 636 W/ 250 OVERRIDE (IP): Performed by: HOSPITALIST

## 2024-01-16 PROCEDURE — 97530 THERAPEUTIC ACTIVITIES: CPT

## 2024-01-16 PROCEDURE — 36415 COLL VENOUS BLD VENIPUNCTURE: CPT

## 2024-01-16 PROCEDURE — 99232 SBSQ HOSP IP/OBS MODERATE 35: CPT | Performed by: HOSPITALIST

## 2024-01-16 PROCEDURE — 97535 SELF CARE MNGMENT TRAINING: CPT

## 2024-01-16 PROCEDURE — 700111 HCHG RX REV CODE 636 W/ 250 OVERRIDE (IP): Mod: JZ | Performed by: INTERNAL MEDICINE

## 2024-01-16 PROCEDURE — 700105 HCHG RX REV CODE 258: Performed by: HOSPITALIST

## 2024-01-16 PROCEDURE — 80069 RENAL FUNCTION PANEL: CPT

## 2024-01-16 PROCEDURE — 99223 1ST HOSP IP/OBS HIGH 75: CPT | Performed by: PHYSICAL MEDICINE & REHABILITATION

## 2024-01-16 PROCEDURE — 85027 COMPLETE CBC AUTOMATED: CPT

## 2024-01-16 PROCEDURE — A9270 NON-COVERED ITEM OR SERVICE: HCPCS | Performed by: HOSPITALIST

## 2024-01-16 PROCEDURE — A9270 NON-COVERED ITEM OR SERVICE: HCPCS | Mod: JZ | Performed by: HOSPITALIST

## 2024-01-16 PROCEDURE — 770001 HCHG ROOM/CARE - MED/SURG/GYN PRIV*

## 2024-01-16 PROCEDURE — 700102 HCHG RX REV CODE 250 W/ 637 OVERRIDE(OP): Performed by: HOSPITALIST

## 2024-01-16 PROCEDURE — 700102 HCHG RX REV CODE 250 W/ 637 OVERRIDE(OP): Mod: JZ | Performed by: HOSPITALIST

## 2024-01-16 RX ORDER — POTASSIUM CHLORIDE 20 MEQ/1
40 TABLET, EXTENDED RELEASE ORAL ONCE
Status: COMPLETED | OUTPATIENT
Start: 2024-01-16 | End: 2024-01-16

## 2024-01-16 RX ADMIN — FLUOXETINE HYDROCHLORIDE 40 MG: 20 CAPSULE ORAL at 16:08

## 2024-01-16 RX ADMIN — POTASSIUM CHLORIDE 40 MEQ: 1500 TABLET, EXTENDED RELEASE ORAL at 08:43

## 2024-01-16 RX ADMIN — ENOXAPARIN SODIUM 40 MG: 100 INJECTION SUBCUTANEOUS at 16:08

## 2024-01-16 RX ADMIN — HYDROMORPHONE HYDROCHLORIDE 0.5 MG: 1 INJECTION, SOLUTION INTRAMUSCULAR; INTRAVENOUS; SUBCUTANEOUS at 20:49

## 2024-01-16 RX ADMIN — OXYCODONE 5 MG: 5 TABLET ORAL at 12:27

## 2024-01-16 RX ADMIN — SODIUM CHLORIDE: 9 INJECTION, SOLUTION INTRAVENOUS at 08:45

## 2024-01-16 RX ADMIN — DOCUSATE SODIUM 50 MG AND SENNOSIDES 8.6 MG 2 TABLET: 8.6; 5 TABLET, FILM COATED ORAL at 04:28

## 2024-01-16 RX ADMIN — OXYCODONE 5 MG: 5 TABLET ORAL at 17:31

## 2024-01-16 RX ADMIN — HYDROMORPHONE HYDROCHLORIDE 0.5 MG: 1 INJECTION, SOLUTION INTRAMUSCULAR; INTRAVENOUS; SUBCUTANEOUS at 14:44

## 2024-01-16 RX ADMIN — ALPRAZOLAM 0.5 MG: 0.5 TABLET ORAL at 23:00

## 2024-01-16 RX ADMIN — OXYCODONE 5 MG: 5 TABLET ORAL at 22:59

## 2024-01-16 RX ADMIN — FLUOXETINE HYDROCHLORIDE 40 MG: 20 CAPSULE ORAL at 04:28

## 2024-01-16 RX ADMIN — OMEPRAZOLE 40 MG: 20 CAPSULE, DELAYED RELEASE ORAL at 04:28

## 2024-01-16 RX ADMIN — SODIUM CHLORIDE: 9 INJECTION, SOLUTION INTRAVENOUS at 18:09

## 2024-01-16 RX ADMIN — OXYCODONE 5 MG: 5 TABLET ORAL at 08:53

## 2024-01-16 RX ADMIN — CYCLOBENZAPRINE 5 MG: 10 TABLET, FILM COATED ORAL at 18:07

## 2024-01-16 RX ADMIN — NICOTINE 14 MG: 14 PATCH TRANSDERMAL at 04:27

## 2024-01-16 RX ADMIN — OMEPRAZOLE 40 MG: 20 CAPSULE, DELAYED RELEASE ORAL at 16:08

## 2024-01-16 ASSESSMENT — COGNITIVE AND FUNCTIONAL STATUS - GENERAL
DAILY ACTIVITIY SCORE: 17
TOILETING: A LOT
DRESSING REGULAR UPPER BODY CLOTHING: A LITTLE
HELP NEEDED FOR BATHING: A LOT
DRESSING REGULAR LOWER BODY CLOTHING: A LOT
SUGGESTED CMS G CODE MODIFIER DAILY ACTIVITY: CK

## 2024-01-16 ASSESSMENT — ENCOUNTER SYMPTOMS
VOMITING: 0
FEVER: 0
DIARRHEA: 0
INSOMNIA: 0
WHEEZING: 0
CHILLS: 0
FALLS: 1
ABDOMINAL PAIN: 0
SHORTNESS OF BREATH: 0
HEMOPTYSIS: 0
NERVOUS/ANXIOUS: 0
DIZZINESS: 0
HEADACHES: 0
TREMORS: 0
CONSTIPATION: 0
COUGH: 0
MYALGIAS: 1
NAUSEA: 0
BLOOD IN STOOL: 0

## 2024-01-16 ASSESSMENT — PAIN DESCRIPTION - PAIN TYPE
TYPE: SURGICAL PAIN

## 2024-01-16 NOTE — HOSPITAL COURSE
68 year old female presented with fall, injuring left leg. Imaging showed left displaced, comminuted supracondylar femur fracture s/p ORIF by Dr. Amezquita, Ortho 1/12. Leukocytosis trending down 15-12.    She was having pain and muscle spasms. Ordred Valium. Pain is now better controlled. Ortho recs: TTWB PT/OT mobilization ASAP.  Mild hyponatremia, monitor.     PT/OT pending. Pain improved PT/OT recommends skilled but patient prefers home (she lives on a mobile home) or rehab

## 2024-01-16 NOTE — PROGRESS NOTES
0655 Patient's in bed. Bedside report received from GOLDEN Ruiz RN at the beginning of the shift.     0822 New order received and acknowledged from Dr Almodovar - see MAR and labs.     0840 Patient's sitting up in bed. Educated on the importance/use of IS at least 10x every hour while awake, able to reach 500. Fall protocol in effect. Call light within reach. Reminded patient to call for assist. Assessment completed. No distress noted. Plan of car reviewed with the patient. Verbalized understanding.    0916 New order received and acknowledged from Dr Guerrero -  Consult for Physiatry.     1000 Dr Almodovar visited. POC discussed with the patient.    1120 Patient's in bed. No distress noted.    1227 Medicated with Oxycodone (see MAR) for c/o's left leg pain, rates pain 9/10.    1444 Medicated with Dilaudid (see MAR) for c/o's left leg pain, rates pain 10/10. Ice pack given.    1530 Patient's in bed. No distress noted.    1650 ANDREA Chilel worked with the patient.    1731 Medicated with Oxycodone (see MAR) for c/o's left leg pain, rates pain 8/10.    1807 Medicated with Flexeril (see MAR) for c/o's muscle spasms.    1900  Patient's in bed. No changes in status. Bedside report given to Oncoming GOLDEN RN (Joseph).

## 2024-01-16 NOTE — CARE PLAN
The patient is Stable - Low risk of patient condition declining or worsening    Shift Goals  Clinical Goals: safety, pain control  Patient Goals: rest, comfort  Family Goals: no family present    Progress made toward(s) clinical / shift goals:    Problem: Pain - Standard  Goal: Alleviation of pain or a reduction in pain to the patient’s comfort goal  Outcome: Progressing    Administer pain medications per MAR.     Problem: Fall Risk  Goal: Patient will remain free from falls  Outcome: Progressing    Kept side rails up and bed to its lowest position. Kept call bell within reach.        Patient is not progressing towards the following goals:

## 2024-01-16 NOTE — CONSULTS
Physical Medicine and Rehabilitation Consultation              Date of initial consultation: 1/16/2024  Requesting provider: ordered by Logan Guerrero M.D. at 01/16/24 0916    Consulting provider: Edel Lopez D.O.  Reason for consultation: assess for acute inpatient rehab appropriateness  LOS: 4 Day(s)    Chief complaint: LLE pain after GLF     HPI: The patient is a 68 y.o. female with a past medical history of tobacco dependence, history of L CARLYN, and anxiety;  who presented on 1/12/2024  9:00 AM with left hip pain after a GLF. Per documentation, patient had a mechanical fall, landing on her left side. She did not hit her head, but was unable to stand. Upon eval in the ED, images showed a comminuted fracture of the distal left femur. Ortho was consulted, and patient was taken to the OR on 1/12 for open treatment with internal fixation of R supracondylar femur fracture without intercondylar extension performed by Dr. Amezquita. Patient is TTWB LLE. Patient's hospital course has been notable for ABLA and Hypokalemia. Patient has not been able to tolerate mobility with therapies yet.     Patient seen and examined at bedside, patient reports she feels ok, but is overwhelmed. Patient tearful expressing that she is very independent and has not needed help in the past. Reports very supportive group of friends, main friend to help is Nidia. Patient reports LLE pain, but otherwise feeling ok. Does not report HA, lightheadedness, SOB, CP, abdominal pain, or changes in numbness/tingling/weakness.       Social Hx:  Patient lives alone in a mobile home with 5 MOMO. Has a friend that can stop by multiple times a day   5 MOMO  At prior level of function patient was Independent with mobility and ADLs.         Employment: [employer]   Tobacco: daily cigarette use   Alcohol: Denies   Drugs: Denies     THERAPY:  Restrictions: Fall Risk, TTWB LLE   PT: Functional mobility   1/15 Min A bed mobility, unable to tolerate  gait, unable to tolerate sit to stand, increased pain at EOB     OT: ADLs  1/15 Mod A sit to stand, Mod A transfers, Max A lower body dressing     SLP:   None     IMAGING:  CT-KNEE W/O PLUS RECONS LEFT   Final Result       Comminuted distal femur fracture. No definite extension to the articular surface.       DX-KNEE 3 VIEWS LEFT   Final Result       1.  Comminuted and impacted fracture of the distal left femur without evidence of articular extension. Persistent posterior angulation post reduction.   2.  Osteopenia.       DX-KNEE 3 VIEWS LEFT   Final Result       1.  Partially visualized, comminuted fracture of the distal left femur without evidence of articular extension.   2.  Osteopenia.       DX-FEMUR-2+ LEFT   Final Result       1.  Severely comminuted fracture of the distal femur. Recommend knee radiographs to more confidently exclude extension to the knee joint.   2.  Prior LEFT hip arthroplasty   3.  Osteopenia   4.  Osteoarthritis       DX-PELVIS-1 OR 2 VIEWS   Final Result       1.  Deformity of the RIGHT pubic bone compatible with age indeterminate trauma. This could be acute or chronic fracture. Comparison with any prior images would be helpful.   2.  Prior LEFT hip arthroplasty   3.  Osteopenia   4.  Osteoarthritis       PROCEDURES:  1/12 Open treatment with internal fixation of right supracondylar femur fracture without intercondylar extension by Dr. Amezquita     PMH:  Past Medical History:   Diagnosis Date    Arthritis     Heart burn     Psychiatric problem        PSH:  Past Surgical History:   Procedure Laterality Date    ORIF, FRACTURE, FEMUR Left 1/12/2024    Procedure: ORIF, FRACTURE, DISTAL FEMUR;  Surgeon: Edi Amezquita M.D.;  Location: SURGERY [facility];  Service: Orthopedics    HIP ARTH ANTERIOR TOTAL Left 5/19/2016    Procedure: HIP ARTHROPLASTY ANTERIOR TOTAL conversion ;  Surgeon: Samuel Ocampo M.D.;  Location: [facility];  Service:     OTHER CARDIAC SURGERY  2015     "ablation    OTHER ORTHOPEDIC SURGERY      Left hip pinning       FHX:  No family history on file.    Medications:  Current Facility-Administered Medications   Medication Dose    ipratropium-albuterol (DUONEB) nebulizer solution  3 mL    diazePAM (Valium) tablet 5 mg  5 mg    enoxaparin (Lovenox) inj 40 mg  40 mg    acetaminophen (Tylenol) tablet 650 mg  650 mg    senna-docusate (Pericolace Or Senokot S) 8.6-50 MG per tablet 2 Tablet  2 Tablet    And    polyethylene glycol/lytes (Miralax) Packet 1 Packet  1 Packet    And    magnesium hydroxide (Milk Of Magnesia) suspension 30 mL  30 mL    And    bisacodyl (Dulcolax) suppository 10 mg  10 mg    Pharmacy Consult Request ...Pain Management Review 1 Each  1 Each    ondansetron (Zofran) syringe/vial injection 4 mg  4 mg    ondansetron (Zofran ODT) dispertab 4 mg  4 mg    hydrALAZINE (Apresoline) injection 10 mg  10 mg    albuterol inhaler 1-2 Puff  1-2 Puff    ALPRAZolam (Xanax) tablet 0.5 mg  0.5 mg    cyclobenzaprine (Flexeril) tablet 5 mg  5 mg    FLUoxetine (PROzac) capsule 40 mg  40 mg    omeprazole (PriLOSEC) capsule 40 mg  40 mg    nicotine (Nicoderm) 14 MG/24HR 14 mg  14 mg    And    nicotine polacrilex (Nicorette) 2 MG piece 2 mg  2 mg    NS infusion      labetalol (Normodyne/Trandate) injection 10 mg  10 mg    oxyCODONE immediate-release (Roxicodone) tablet 2.5 mg  2.5 mg    Or    oxyCODONE immediate-release (Roxicodone) tablet 5 mg  5 mg    Or    HYDROmorphone (Dilaudid) injection 0.5 mg  0.5 mg       Allergies:  Allergies   Allergen Reactions    Sulfa Drugs Itching and Nausea       Physical Exam:  Vitals: /89   Pulse 93   Temp 36.4 °C (97.6 °F) (Temporal)   Resp 16   Ht 1.6 m (5' 3\")   Wt 75.8 kg (167 lb)   SpO2 95%   Gen: NAD, laying comfortably in bed   Head:  NC/AT  Eyes/ Nose/ Mouth: PERRLA, moist mucous membranes  Cardio: RRR, good distal perfusion, warm extremities  Pulm: normal respiratory effort, no cyanosis, on RA   Abd: Soft NTND, " negative borborygmi   Ext: No peripheral edema. No calf tenderness. No clubbing. LE acewrap in place     Mental status:  A&Ox4 (person, place, date, situation) answers questions appropriately follows commands  Speech: fluent, no aphasia or dysarthria    CRANIAL NERVES:  2,3: visual acuity grossly intact, PERRL  3,4,6: EOMI bilaterally, no nystagmus or diplopia  5: sensation intact to light touch bilaterally and symmetric  7: no facial asymmetry      Motor:      Upper Extremity  Myotome R L   Shoulder flexion C5 5/5 5/5   Elbow flexion C5 5/5 5/5   Wrist extension C6 5/5 5/5   Elbow extension C7 5/5 5/5   Finger flexion C8 5/5 5/5   Finger abduction T1 5/5 5/5     Lower Extremity Myotome R L   Hip flexion L2 5/5 2/5   Knee extension L3 5/5 2/5   Ankle dorsiflexion L4 5/5 4/5   Toe extension L5 5/5 4/5   Ankle plantarflexion S1 5/5 4/5       Sensory:   intact to light touch through out    DTRs: 2+ in bilateral  biceps  No clonus at bilateral ankles  Negative Morales b/l     Tone: no spasticity noted, no cogwheeling noted    Coordination:   intact finger to nose bilaterally  intact fine motor with fingers bilaterally      Labs: Reviewed and significant for   Recent Labs     01/14/24  0554 01/15/24  0807 01/16/24  0021   RBC 3.32* 2.97* 2.78*   HEMOGLOBIN 10.3* 9.4* 8.9*   HEMATOCRIT 32.1* 27.9* 25.9*   PLATELETCT 191 194 211     Recent Labs     01/14/24  0554 01/15/24  0807 01/16/24  0021   SODIUM 133* 135 135   POTASSIUM 4.1 3.3* 3.3*   CHLORIDE 101 101 99   CO2 24 27 24   GLUCOSE 126* 116* 123*   BUN 8 6* 8   CREATININE 0.40* 0.30* 0.41*   CALCIUM 8.1* 8.2* 8.1*     Recent Results (from the past 24 hour(s))   CBC WITHOUT DIFFERENTIAL    Collection Time: 01/16/24 12:21 AM   Result Value Ref Range    WBC 10.7 4.8 - 10.8 K/uL    RBC 2.78 (L) 4.20 - 5.40 M/uL    Hemoglobin 8.9 (L) 12.0 - 16.0 g/dL    Hematocrit 25.9 (L) 37.0 - 47.0 %    MCV 93.2 81.4 - 97.8 fL    MCH 32.0 27.0 - 33.0 pg    MCHC 34.4 32.2 - 35.5 g/dL     RDW 41.1 35.9 - 50.0 fL    Platelet Count 211 164 - 446 K/uL    MPV 9.7 9.0 - 12.9 fL   Renal Function Panel    Collection Time: 01/16/24 12:21 AM   Result Value Ref Range    Sodium 135 135 - 145 mmol/L    Potassium 3.3 (L) 3.6 - 5.5 mmol/L    Chloride 99 96 - 112 mmol/L    Co2 24 20 - 33 mmol/L    Glucose 123 (H) 65 - 99 mg/dL    Creatinine 0.41 (L) 0.50 - 1.40 mg/dL    Bun 8 8 - 22 mg/dL    Calcium 8.1 (L) 8.5 - 10.5 mg/dL    Correct Calcium 8.9 8.5 - 10.5 mg/dL    Phosphorus 2.5 2.5 - 4.5 mg/dL    Albumin 3.0 (L) 3.2 - 4.9 g/dL   ESTIMATED GFR    Collection Time: 01/16/24 12:21 AM   Result Value Ref Range    GFR (CKD-EPI) 106 >60 mL/min/1.73 m 2         ASSESSMENT:  Patient is a 68 y.o. female admitted with left     [facility] Code / Diagnosis to Support: 0008.2 - Orthopaedic Disorders: Status Post Femur (Shaft) Fracture    Rehabilitation: Impaired ADLs and mobility  Patient is a good candidate for inpatient rehab based on needs for PT, OT; pending confirmation of DC support from friends.   Barriers to transfer include: Insurance authorization, TCCs to verify disposition, medical clearance and bed availability     Additional Recommendations:  Left femur shaft fracture   - sustained during GLF   - images showed left distal femur shaft fracture  - ortho consulted   - 1/12 open treatment and internal fixation of left supracondylar femur fracture  by Dr. Amezquita   - TTWB LLE   - continue with PT/OT     ABLA  - post op drop in Hgb   - 1/16  Hgb 8.9 , continues to trend down s/p IVF   - primary team monitoring CBC     Hypokalemia   -1/16 K 3.3   - primary team monitoring electrolytes     Anxiety/Depression   - on home dose fluoxetine     Dispo:  - patient is currently functioning below their level of baseline, recommend post acute rehab  - recommend IRF level therapy with 3hr of therapy 5 days per week  - piror to acceptance to IRF, will need confirmation of DC support   - TCC to assist with insurance auth and DC support          Medical Complexity:  Left femur shaft fracture   ABLA   Hypokalemia   Anxiety/Depression   Impaired mobility and ADLs       DVT PPX: Lovenox       Thank you for allowing us to participate in the care of this patient.     Patient was seen for 81 minutes on unit/floor of which > 50% of time was spent on counseling and coordination of care regarding the above, including prognosis, risk reduction, benefits of treatment, and options for next stage of care.    Edel Lopez D.O.   Physical Medicine and Rehabilitation     Please note that this dictation was created using voice recognition software. I have made every reasonable attempt to correct obvious errors, but there may be errors of grammar and possibly content that I did not discover before finalizing the note.

## 2024-01-16 NOTE — PROGRESS NOTES
Hospital Medicine Daily Progress Note    Date of Service  1/15/2024    Chief Complaint  Windy León is a 68 y.o. female admitted 1/12/2024 with GLF (MGLF on ice today, denies hitting head / LOC) and Leg Injury (LLE swelling / pain in L thigh s/p GLF today, hx osteoporosis and L hip replacement, +CMS distal to injury)    Hospital Course  No notes on file  See below  Interval Problem Update  I reviewed the chart along with vitals, labs, imaging, test (both pending and resulted) and recommendations from specialists and interdisciplinary team.  68 year old female presented with fall, injuring left leg. Imaging showed left displaced, comminuted supracondylar femur fracture s/p ORIF by Dr. Amezquita, Ortho 1/12. Leukocytosis trending down 15-12.    She was having pain and muscle spasms. Ordred Valium. Pain is now better controlled. Ortho recs: TTWB PT/OT mobilization ASAP.  Mild hyponatremia, monitor.     PT/OT pending. Pain improved PT/OT recommends skilled but patient prefers home (she lives on a mobile home) or rehab    I have discussed this patient's plan of care and discharge plan at IDT rounds today with Case Management, Nursing, Nursing leadership, and other members of the IDT team.    Consultants/Specialty  Orthopedics    Code Status  Full Code    Disposition  The patient is medically cleared for discharge to home or a post-acute facility.    I have placed the appropriate orders for post-discharge needs.    Review of Systems  Review of Systems   Constitutional:  Negative for chills and fever.   HENT:  Negative for congestion, hearing loss and nosebleeds.    Eyes:  Negative for pain and redness.   Respiratory:  Negative for cough, hemoptysis, shortness of breath and wheezing.    Cardiovascular:  Negative for chest pain and palpitations.   Gastrointestinal:  Negative for abdominal pain, blood in stool, constipation, diarrhea, nausea and vomiting.   Genitourinary:  Negative for dysuria, frequency and hematuria.    Musculoskeletal:  Positive for falls, joint pain and myalgias.   Skin:  Negative for rash.   Neurological:  Negative for dizziness, tremors, focal weakness, seizures, loss of consciousness, weakness and headaches.   Psychiatric/Behavioral:  The patient is not nervous/anxious and does not have insomnia.    All other systems reviewed and are negative.       Physical Exam  Temp:  [36.2 °C (97.2 °F)-36.8 °C (98.2 °F)] 36.2 °C (97.2 °F)  Pulse:  [102-108] 108  Resp:  [16-17] 16  BP: (135-155)/(71-82) 135/82  SpO2:  [93 %-98 %] 98 %    Physical Exam  Vitals and nursing note reviewed.   Constitutional:       General: She is not in acute distress.  HENT:      Head: Normocephalic and atraumatic.      Right Ear: External ear normal.      Left Ear: External ear normal.      Nose: Nose normal.      Mouth/Throat:      Mouth: Mucous membranes are moist.   Eyes:      General: No scleral icterus.     Conjunctiva/sclera: Conjunctivae normal.   Cardiovascular:      Rate and Rhythm: Normal rate and regular rhythm.      Pulses: Normal pulses.      Heart sounds: No murmur heard.     No friction rub. No gallop.   Pulmonary:      Effort: Pulmonary effort is normal. No respiratory distress.      Breath sounds: Normal breath sounds. No stridor. No wheezing, rhonchi or rales.   Chest:      Chest wall: No tenderness.   Abdominal:      General: Abdomen is flat. Bowel sounds are normal. There is no distension.      Palpations: Abdomen is soft.      Tenderness: There is no abdominal tenderness. There is no guarding or rebound.   Musculoskeletal:         General: Tenderness (L leg soreness) present. No swelling or deformity. Normal range of motion.      Cervical back: Normal range of motion and neck supple. No rigidity.   Skin:     General: Skin is warm.      Coloration: Skin is not jaundiced.   Neurological:      General: No focal deficit present.      Mental Status: She is alert and oriented to person, place, and time. Mental status is at  baseline.   Psychiatric:         Mood and Affect: Mood normal.         Behavior: Behavior normal.         Thought Content: Thought content normal.         Judgment: Judgment normal.         Fluids    Intake/Output Summary (Last 24 hours) at 1/15/2024 1727  Last data filed at 1/15/2024 1100  Gross per 24 hour   Intake --   Output 3100 ml   Net -3100 ml         Laboratory  Recent Labs     01/13/24  0541 01/14/24  0554 01/15/24  0807   WBC 15.0* 12.0* 10.8   RBC 3.94* 3.32* 2.97*   HEMOGLOBIN 12.3 10.3* 9.4*   HEMATOCRIT 36.9* 32.1* 27.9*   MCV 93.7 96.7 93.9   MCH 31.2 31.0 31.6   MCHC 33.3 32.1* 33.7   RDW 42.6 44.6 41.4   PLATELETCT 243 191 194   MPV 9.8 9.8 9.9       Recent Labs     01/13/24  0541 01/14/24  0554 01/15/24  0807   SODIUM 132* 133* 135   POTASSIUM 4.3 4.1 3.3*   CHLORIDE 99 101 101   CO2 22 24 27   GLUCOSE 123* 126* 116*   BUN 9 8 6*   CREATININE 0.41* 0.40* 0.30*   CALCIUM 8.4* 8.1* 8.2*                     Imaging  DX-PORTABLE FLUORO > 1 HOUR   Preliminary Result      Portable fluoroscopy utilized for 1 minute 37 seconds.         INTERPRETING LOCATION: [address] [place] [address], [facility]      DX-KNEE 2- LEFT   Final Result         1.  Intraoperative changes of distal femoral ORIF in progress      CT-KNEE W/O PLUS RECONS LEFT   Final Result      Comminuted distal femur fracture. No definite extension to the articular surface.      DX-KNEE 3 VIEWS LEFT   Final Result      1.  Comminuted and impacted fracture of the distal left femur without evidence of articular extension. Persistent posterior angulation post reduction.   2.  Osteopenia.      DX-KNEE 3 VIEWS LEFT   Final Result      1.  Partially visualized, comminuted fracture of the distal left femur without evidence of articular extension.   2.  Osteopenia.      DX-FEMUR-2+ LEFT   Final Result      1.  Severely comminuted fracture of the distal femur. Recommend knee radiographs to more confidently exclude extension to the knee joint.   2.  Prior LEFT hip  "arthroplasty   3.  Osteopenia   4.  Osteoarthritis      DX-PELVIS-1 OR 2 VIEWS   Final Result      1.  Deformity of the RIGHT pubic bone compatible with age indeterminate trauma. This could be acute or chronic fracture. Comparison with any prior images would be helpful.   2.  Prior LEFT hip arthroplasty   3.  Osteopenia   4.  Osteoarthritis           Assessment/Plan  * Severely comminuted fracture of the left distal femur- (present on admission)  Assessment & Plan  Orthopedics consulted, anticipate need for operative treatment    Multimodal pain control  Consider physical and Occupational Therapy, pharmacologic prophylaxis when okay with orthopedics   Revised Cardiac Risk Index for Pre-Operative Risk score of:   2 points Class III Risk  The patient has a 10.1 % 30-day risk of death, MI, or cardiac arrest   The patient has an elevated BMI of 29.  She is at higher risk for postoperative hypoxia. I will order continuous pulse oximetry  Emphasized incentive spirometry, consider BiPAP/CPAP postprocedure    S/p ORIF 1/12.   Wean off O2.  Ordered antispasmodic. Monitor mentation/sedation score, respirations blood ressure if on narcotics.  PT/OT recommend skilled. She prefers rehab  or home.    Dehydration- (present on admission)  Assessment & Plan  Likely secondary to reduced oral intake.   Encourage oral intake as tolerated, antiemetics as needed.  Intravenous hydration until adequate oral intake is achieved.     Hyponatremia- (present on admission)  Assessment & Plan  Hypovolemic hyponatremia  I expect Na to improve with IVF     Anxiety- (present on admission)  Assessment & Plan  Resume fluoxetine   As needed alprazolam    BMI 29.0-29.9,adult- (present on admission)  Assessment & Plan  The patient has an elevated BMI of 29.  She is at higher risk for postoperative hypoxia. I will order continuous pulse oximetry  Emphasized incentive spirometry, consider BiPAP/CPAP postprocedure  \"    Recommended weight loss advised, 5% " through reduced calorie, low carb diet and 150 mins of exercise a week once better  Recommend bariatric surgery evaluation if morbidly obese  Educated on the increase of morbidity and mortality associated with excess weight including DM, Heart Disease, HTN, stroke, and sleep apnea. Recommended outpatient monitoring  of blood sugars, lipid panel, sleep study evaluation for metabolic syndrome.        Hypertensive urgency- (present on admission)  Assessment & Plan  Likely secondary to severe pain  I will place on multimodal pain medications   I will start as needed labetalol for extreme hypertension  Consider starting scheduled antihypertensive medications according to blood pressure trend  Vitals:    01/15/24 1700   BP:    Pulse:    Resp: 16   Temp:    SpO2:      Stable    Tobacco dependence- (present on admission)  Assessment & Plan  I spent 4 minutes on Tobacco cessation education and counseling.   I Discussed the benefits of quitting smoking and risks of continued smoking including cardiovascular disease, cancer and COPD.   Discussed the option of nicotine patch.          VTE prophylaxis: Ordered Lovenox ppx    I have performed a physical exam and reviewed and updated ROS and Plan today (1/15/2024). In review of yesterday's note (1/14/2024), there are no changes except as documented above.

## 2024-01-16 NOTE — CARE PLAN
The patient is Stable - Low risk of patient condition declining or worsening    Shift Goals  Clinical Goals: pain control, safety, moblity  Patient Goals: pain control, comfort  Family Goals: no family present    Progress made toward(s) clinical / shift goals:    Problem: Pain - Standard  Goal: Alleviation of pain or a reduction in pain to the patient’s comfort goal  Outcome: Progressing   Educated on pain scale. Encouraged to verbalize pain. Will medicate as per MAR.    Problem: Knowledge Deficit - Standard  Goal: Patient and family/care givers will demonstrate understanding of plan of care, disease process/condition, diagnostic tests and medications  Outcome: Progressing   POC discussed with the patient. Questions answered. Verbalized understanding.    Problem: Skin Integrity  Goal: Skin integrity is maintained or improved  Outcome: Progressing   On lowe airloss mattress. Cream applied for every after incontinence    Problem: Fall Risk  Goal: Patient will remain free from falls  Outcome: Progressing  Fall protocol in effect. Call light within reach. Reminded patient to call for assist. Kept room clutter free. Hourly rounds in effect. Verbalized understanding.    Patient is not progressing towards the following goals:

## 2024-01-16 NOTE — PROGRESS NOTES
"    Orthopedic PA Progress Note    Interval changes:  Patient doing well.   Likely going to rehab  LLE dressings are CDI  - May perform knee ROM as tolerated  TTWB LLE  Cleared for DC from orthopedic standpoint pending dispo planning    ROS - Patient denies any new issues.  Denies any numbness or tingling. Pain well controlled.    /89   Pulse 95   Temp 36.4 °C (97.6 °F) (Temporal)   Resp 16   Ht 1.6 m (5' 3\")   Wt 75.8 kg (167 lb)   SpO2 95%     Patient seen and examined  No acute distress  Breathing non labored  RRR  LLE: Surgical dressing is clean, dry, and intact. Patient clearly fires tibialis anterior, EHL, and gastrocnemius/soleus. Sensation is intact to light touch throughout superficial peroneal, deep peroneal, tibial, saphenous, and sural nerve distributions. Strong and palpable 2+ dorsalis pedis and posterior tibial pulses with capillary refill less than 2 seconds.     Recent Labs     01/14/24  0554 01/15/24  0807 01/16/24  0021   WBC 12.0* 10.8 10.7   RBC 3.32* 2.97* 2.78*   HEMOGLOBIN 10.3* 9.4* 8.9*   HEMATOCRIT 32.1* 27.9* 25.9*   MCV 96.7 93.9 93.2   MCH 31.0 31.6 32.0   MCHC 32.1* 33.7 34.4   RDW 44.6 41.4 41.1   PLATELETCT 191 194 211   MPV 9.8 9.9 9.7         Active Hospital Problems    Diagnosis     Severely comminuted fracture of the left distal femur [T14.8XXA]     Tobacco dependence [F17.200]     Hypertensive urgency [I16.0]     BMI 29.0-29.9,adult [Z68.29]     Anxiety [F41.9]     Hyponatremia [E87.1]     Dehydration [E86.0]     Gastroesophageal reflux disease [K21.9]        Assessment/Plan:  Patient doing well.   Likely going to rehab  LLE dressings are CDI  - May perform knee ROM as tolerated  TTWB LLE  Cleared for DC from orthopedic standpoint pending dispo planning    POD#4 S/p  Open treatment with internal fixation of right   supracondylar femur fracture without intercondylar extension.  Wt bearing status - TTWB LLE  Wound care/Drains - Dressings to be changed every other day " by nursing. Or PRN for saturation starting POD#2  Future Procedures - None planned   Lovenox: Start 1/13, Duration-until ambulatory > 150'  Sutures/Staples out- 14-21 days post operatively. Removal will completed by ortho ADONIS's unless transferred.  DVT Prophylaxis outpatient: ASA 81 mg PO BID x4 weeks  PT/OT-initiated  Antibiotics:  Perioperative completed  DVT Prophylaxis- TEDS/SCDs/Foot pumps  Flanagan-not needed per ortho  Case Coordination for Discharge Planning - Disposition per therapy recs.

## 2024-01-16 NOTE — PROGRESS NOTES
Hospital Medicine Daily Progress Note    Date of Service  1/16/2024    Chief Complaint  Windy León is a 68 y.o. female admitted 1/12/2024 with GLF (MGLF on ice today, denies hitting head / LOC) and Leg Injury (LLE swelling / pain in L thigh s/p GLF today, hx osteoporosis and L hip replacement, +CMS distal to injury)    Hospital Course  68 year old female presented with fall, injuring left leg. Imaging showed left displaced, comminuted supracondylar femur fracture s/p ORIF by Dr. Amezquita, Ortho 1/12. Leukocytosis trending down 15-12.    She was having pain and muscle spasms. Ordred Valium. Pain is now better controlled. Ortho recs: TTWB PT/OT mobilization ASAP.  Mild hyponatremia, monitor.     PT/OT pending. Pain improved PT/OT recommends skilled but patient prefers home (she lives on a mobile home) or rehab    Interval Problem Update  1/16: Tearful initially did not want to go to rehab but discussed with physiatrist and patient finally agreed.  Replating potassium.    I have discussed this patient's plan of care and discharge plan at IDT rounds today with Case Management, Nursing, Nursing leadership, and other members of the IDT team.    Disposition  The patient is medically cleared for discharge to home or a post-acute facility.    I have placed the appropriate orders for post-discharge needs.    Review of Systems  Review of Systems   Constitutional:  Negative for chills and fever.   HENT:  Negative for hearing loss.    Respiratory:  Negative for cough, hemoptysis, shortness of breath and wheezing.    Cardiovascular:  Negative for chest pain.   Gastrointestinal:  Negative for abdominal pain, blood in stool, constipation, diarrhea, nausea and vomiting.   Genitourinary:  Negative for dysuria.   Musculoskeletal:  Positive for falls, joint pain and myalgias.   Skin:  Negative for rash.   Neurological:  Negative for dizziness, tremors and headaches.   Psychiatric/Behavioral:  The patient is not nervous/anxious and does  not have insomnia.         Physical Exam  Temp:  [36.4 °C (97.6 °F)-36.7 °C (98.1 °F)] 36.4 °C (97.6 °F)  Pulse:  [93-99] 96  Resp:  [16-17] 16  BP: (132-146)/(67-90) 136/89  SpO2:  [93 %-98 %] 95 %    Physical Exam  Vitals and nursing note reviewed.   Constitutional:       General: She is not in acute distress.  HENT:      Head: Normocephalic and atraumatic.      Right Ear: External ear normal.      Left Ear: External ear normal.      Nose: Nose normal.      Mouth/Throat:      Mouth: Mucous membranes are moist.   Eyes:      General: No scleral icterus.     Conjunctiva/sclera: Conjunctivae normal.   Cardiovascular:      Rate and Rhythm: Normal rate and regular rhythm.      Pulses: Normal pulses.      Heart sounds:      No gallop.   Pulmonary:      Effort: Pulmonary effort is normal. No respiratory distress.      Breath sounds: Normal breath sounds. No wheezing or rales.   Abdominal:      General: Abdomen is flat. Bowel sounds are normal. There is no distension.      Palpations: Abdomen is soft.      Tenderness: There is no abdominal tenderness. There is no guarding.   Musculoskeletal:         General: Tenderness (L leg soreness) present. No swelling or deformity.      Cervical back: Neck supple. No rigidity.   Skin:     General: Skin is warm.      Coloration: Skin is not jaundiced.   Neurological:      General: No focal deficit present.      Mental Status: She is alert and oriented to person, place, and time.   Psychiatric:         Mood and Affect: Mood normal.         Thought Content: Thought content normal.         Fluids    Intake/Output Summary (Last 24 hours) at 1/16/2024 1539  Last data filed at 1/16/2024 0433  Gross per 24 hour   Intake --   Output 2550 ml   Net -2550 ml         Laboratory  Recent Labs     01/14/24  0554 01/15/24  0807 01/16/24  0021   WBC 12.0* 10.8 10.7   RBC 3.32* 2.97* 2.78*   HEMOGLOBIN 10.3* 9.4* 8.9*   HEMATOCRIT 32.1* 27.9* 25.9*   MCV 96.7 93.9 93.2   MCH 31.0 31.6 32.0   MCHC 32.1*  33.7 34.4   RDW 44.6 41.4 41.1   PLATELETCT 191 194 211   MPV 9.8 9.9 9.7       Recent Labs     01/14/24  0554 01/15/24  0807 01/16/24  0021   SODIUM 133* 135 135   POTASSIUM 4.1 3.3* 3.3*   CHLORIDE 101 101 99   CO2 24 27 24   GLUCOSE 126* 116* 123*   BUN 8 6* 8   CREATININE 0.40* 0.30* 0.41*   CALCIUM 8.1* 8.2* 8.1*                     Imaging  DX-PORTABLE FLUORO > 1 HOUR   Preliminary Result      Portable fluoroscopy utilized for 1 minute 37 seconds.         INTERPRETING LOCATION: [address] [place] [address], [place], [address]      DX-KNEE 2- LEFT   Final Result         1.  Intraoperative changes of distal femoral ORIF in progress      CT-KNEE W/O PLUS RECONS LEFT   Final Result      Comminuted distal femur fracture. No definite extension to the articular surface.      DX-KNEE 3 VIEWS LEFT   Final Result      1.  Comminuted and impacted fracture of the distal left femur without evidence of articular extension. Persistent posterior angulation post reduction.   2.  Osteopenia.      DX-KNEE 3 VIEWS LEFT   Final Result      1.  Partially visualized, comminuted fracture of the distal left femur without evidence of articular extension.   2.  Osteopenia.      DX-FEMUR-2+ LEFT   Final Result      1.  Severely comminuted fracture of the distal femur. Recommend knee radiographs to more confidently exclude extension to the knee joint.   2.  Prior LEFT hip arthroplasty   3.  Osteopenia   4.  Osteoarthritis      DX-PELVIS-1 OR 2 VIEWS   Final Result      1.  Deformity of the RIGHT pubic bone compatible with age indeterminate trauma. This could be acute or chronic fracture. Comparison with any prior images would be helpful.   2.  Prior LEFT hip arthroplasty   3.  Osteopenia   4.  Osteoarthritis           Assessment/Plan  * Severely comminuted fracture of the left distal femur- (present on admission)  Assessment & Plan  Orthopedics consulted,  S/p ORIF 1/12.   Ordered antispasmodic. Monitor mentation/sedation score, respirations blood ressure if  "on narcotics.  Patient now agreeable to go to rehab    Dehydration- (present on admission)  Assessment & Plan  Likely secondary to reduced oral intake.   Encourage oral intake as tolerated, antiemetics as needed.    Hyponatremia- (present on admission)  Assessment & Plan  Resolved with IVF    Anxiety- (present on admission)  Assessment & Plan  Resume fluoxetine   As needed alprazolam    BMI 29.0-29.9,adult- (present on admission)  Assessment & Plan  The patient has an elevated BMI of 29.  She is at higher risk for postoperative hypoxia. I will order continuous pulse oximetry  Emphasized incentive spirometry, consider BiPAP/CPAP postprocedure  \"    Recommended weight loss advised, 5% through reduced calorie, low carb diet and 150 mins of exercise a week once better  Recommend bariatric surgery evaluation if morbidly obese  Educated on the increase of morbidity and mortality associated with excess weight including DM, Heart Disease, HTN, stroke, and sleep apnea. Recommended outpatient monitoring  of blood sugars, lipid panel, sleep study evaluation for metabolic syndrome.        Hypertensive urgency- (present on admission)  Assessment & Plan  Now improved    Tobacco dependence- (present on admission)  Assessment & Plan  Counseled on admission         VTE prophylaxis: Ordered Lovenox ppx    I have performed a physical exam and reviewed and updated ROS and Plan today (1/16/2024). In review of yesterday's note (1/15/2024), there are no changes except as documented above.        "

## 2024-01-16 NOTE — PROGRESS NOTES
4 Eyes Skin Assessment Completed by LUCY Meyers and LUCY Kahn.    Head WDL  Ears Redness and Blanching  Nose WDL  Mouth WDL  Neck WDL  Breast/Chest WDL  Shoulder Blades WDL  Spine WDL  (R) Arm/Elbow/Hand Scar elbow and bruising AC  (L) Arm/Elbow/Hand WDL  Abdomen WDL  Groin WDL  Scrotum/Coccyx/Buttocks Redness and Blanching  (R) Leg WDL  (L) Leg Incision dressing CD and I(R) Heel/Foot/Toe WDL  (L) Heel/Foot/Toe WDL (heel float boot placed)          Devices In Places Pulse Ox, SCD's, and Nasal Cannula, pure wick      Interventions In Place Gray Ear Foams, NC W/Ear Foams, Heel Float Boots, Waffle Overlay, TAP System, Pillows, Q2 Turns, Barrier Cream, Dri-Zak Pads, and Pressure Redistribution Mattress    Possible Skin Injury No    Pictures Uploaded Into Epic N/A  Wound Consult Placed N/A  RN Wound Prevention Protocol Ordered No

## 2024-01-16 NOTE — PROGRESS NOTES
"    Orthopedic PA Progress Note    Interval changes:  Patient doing well.   Therapy recommending postacute placement  LLE dressings are CDI  - May perform knee ROM as tolerated  TTWB LLE  Cleared for DC from orthopedic standpoint pending dispo planning    ROS - Patient denies any new issues.  Denies any numbness or tingling. Pain well controlled.    /82   Pulse (!) 108   Temp 36.2 °C (97.2 °F) (Temporal)   Resp 16   Ht 1.6 m (5' 3\")   Wt 75.8 kg (167 lb)   SpO2 98%     Patient seen and examined  No acute distress  Breathing non labored  RRR  LLE: Surgical dressing is clean, dry, and intact. Patient clearly fires tibialis anterior, EHL, and gastrocnemius/soleus. Sensation is intact to light touch throughout superficial peroneal, deep peroneal, tibial, saphenous, and sural nerve distributions. Strong and palpable 2+ dorsalis pedis and posterior tibial pulses with capillary refill less than 2 seconds.     Recent Labs     01/13/24  0541 01/14/24  0554 01/15/24  0807   WBC 15.0* 12.0* 10.8   RBC 3.94* 3.32* 2.97*   HEMOGLOBIN 12.3 10.3* 9.4*   HEMATOCRIT 36.9* 32.1* 27.9*   MCV 93.7 96.7 93.9   MCH 31.2 31.0 31.6   MCHC 33.3 32.1* 33.7   RDW 42.6 44.6 41.4   PLATELETCT 243 191 194   MPV 9.8 9.8 9.9         Active Hospital Problems    Diagnosis     Severely comminuted fracture of the left distal femur [T14.8XXA]     Tobacco dependence [F17.200]     Hypertensive urgency [I16.0]     BMI 29.0-29.9,adult [Z68.29]     Anxiety [F41.9]     Hyponatremia [E87.1]     Dehydration [E86.0]     Gastroesophageal reflux disease [K21.9]        Assessment/Plan:  Patient doing well.   Therapy recommending postacute placement  LLE dressings are CDI  - May perform knee ROM as tolerated  TTWB LLE  Cleared for DC from orthopedic standpoint pending dispo planning    POD#3 S/p  Open treatment with internal fixation of right   supracondylar femur fracture without intercondylar extension.  Wt bearing status - TTWB LLE  Wound care/Drains " - Dressings to be changed every other day by nursing. Or PRN for saturation starting POD#2  Future Procedures - None planned   Lovenox: Start 1/13, Duration-until ambulatory > 150'  Sutures/Staples out- 14-21 days post operatively. Removal will completed by ortho ADONIS's unless transferred.  DVT Prophylaxis outpatient: ASA 81 mg PO BID x4 weeks  PT/OT-initiated  Antibiotics:  Perioperative completed  DVT Prophylaxis- TEDS/SCDs/Foot pumps  Flanagan-not needed per ortho  Case Coordination for Discharge Planning - Disposition per therapy recs.

## 2024-01-17 ENCOUNTER — HOSPITAL ENCOUNTER (INPATIENT)
Facility: REHABILITATION | Age: 69
End: 2024-01-17
Attending: PHYSICAL MEDICINE & REHABILITATION | Admitting: PHYSICAL MEDICINE & REHABILITATION
Payer: MEDICARE

## 2024-01-17 PROBLEM — E66.3 OVERWEIGHT (BMI 25.0-29.9): Status: ACTIVE | Noted: 2024-01-12

## 2024-01-17 LAB
ALBUMIN SERPL BCP-MCNC: 2.6 G/DL (ref 3.2–4.9)
BUN SERPL-MCNC: 6 MG/DL (ref 8–22)
CALCIUM ALBUM COR SERPL-MCNC: 9.6 MG/DL (ref 8.5–10.5)
CALCIUM SERPL-MCNC: 8.5 MG/DL (ref 8.5–10.5)
CHLORIDE SERPL-SCNC: 104 MMOL/L (ref 96–112)
CO2 SERPL-SCNC: 26 MMOL/L (ref 20–33)
CREAT SERPL-MCNC: 0.3 MG/DL (ref 0.5–1.4)
ERYTHROCYTE [DISTWIDTH] IN BLOOD BY AUTOMATED COUNT: 39.8 FL (ref 35.9–50)
GFR SERPLBLD CREATININE-BSD FMLA CKD-EPI: 114 ML/MIN/1.73 M 2
GLUCOSE SERPL-MCNC: 109 MG/DL (ref 65–99)
HCT VFR BLD AUTO: 26.9 % (ref 37–47)
HGB BLD-MCNC: 9 G/DL (ref 12–16)
MCH RBC QN AUTO: 30.6 PG (ref 27–33)
MCHC RBC AUTO-ENTMCNC: 33.5 G/DL (ref 32.2–35.5)
MCV RBC AUTO: 91.5 FL (ref 81.4–97.8)
PHOSPHATE SERPL-MCNC: 3.4 MG/DL (ref 2.5–4.5)
PLATELET # BLD AUTO: 265 K/UL (ref 164–446)
PMV BLD AUTO: 9.7 FL (ref 9–12.9)
POTASSIUM SERPL-SCNC: 3.5 MMOL/L (ref 3.6–5.5)
RBC # BLD AUTO: 2.94 M/UL (ref 4.2–5.4)
SODIUM SERPL-SCNC: 140 MMOL/L (ref 135–145)
WBC # BLD AUTO: 6.1 K/UL (ref 4.8–10.8)

## 2024-01-17 PROCEDURE — 700105 HCHG RX REV CODE 258: Performed by: HOSPITALIST

## 2024-01-17 PROCEDURE — 99233 SBSQ HOSP IP/OBS HIGH 50: CPT | Performed by: HOSPITALIST

## 2024-01-17 PROCEDURE — 36415 COLL VENOUS BLD VENIPUNCTURE: CPT

## 2024-01-17 PROCEDURE — 80069 RENAL FUNCTION PANEL: CPT

## 2024-01-17 PROCEDURE — 700102 HCHG RX REV CODE 250 W/ 637 OVERRIDE(OP): Performed by: HOSPITALIST

## 2024-01-17 PROCEDURE — 770001 HCHG ROOM/CARE - MED/SURG/GYN PRIV*

## 2024-01-17 PROCEDURE — A9270 NON-COVERED ITEM OR SERVICE: HCPCS | Performed by: HOSPITALIST

## 2024-01-17 PROCEDURE — 700111 HCHG RX REV CODE 636 W/ 250 OVERRIDE (IP): Mod: JZ | Performed by: INTERNAL MEDICINE

## 2024-01-17 PROCEDURE — 85027 COMPLETE CBC AUTOMATED: CPT

## 2024-01-17 PROCEDURE — 700111 HCHG RX REV CODE 636 W/ 250 OVERRIDE (IP): Performed by: HOSPITALIST

## 2024-01-17 PROCEDURE — 97530 THERAPEUTIC ACTIVITIES: CPT | Mod: CQ

## 2024-01-17 RX ADMIN — OMEPRAZOLE 40 MG: 20 CAPSULE, DELAYED RELEASE ORAL at 05:03

## 2024-01-17 RX ADMIN — SODIUM CHLORIDE: 9 INJECTION, SOLUTION INTRAVENOUS at 18:55

## 2024-01-17 RX ADMIN — ALPRAZOLAM 0.5 MG: 0.5 TABLET ORAL at 21:52

## 2024-01-17 RX ADMIN — OXYCODONE 5 MG: 5 TABLET ORAL at 18:34

## 2024-01-17 RX ADMIN — OXYCODONE 5 MG: 5 TABLET ORAL at 20:58

## 2024-01-17 RX ADMIN — FLUOXETINE HYDROCHLORIDE 40 MG: 20 CAPSULE ORAL at 05:02

## 2024-01-17 RX ADMIN — CYCLOBENZAPRINE 5 MG: 10 TABLET, FILM COATED ORAL at 23:35

## 2024-01-17 RX ADMIN — DOCUSATE SODIUM 50 MG AND SENNOSIDES 8.6 MG 2 TABLET: 8.6; 5 TABLET, FILM COATED ORAL at 16:16

## 2024-01-17 RX ADMIN — OMEPRAZOLE 40 MG: 20 CAPSULE, DELAYED RELEASE ORAL at 16:16

## 2024-01-17 RX ADMIN — FLUOXETINE HYDROCHLORIDE 40 MG: 20 CAPSULE ORAL at 16:16

## 2024-01-17 RX ADMIN — HYDRALAZINE HYDROCHLORIDE 10 MG: 20 INJECTION, SOLUTION INTRAMUSCULAR; INTRAVENOUS at 21:52

## 2024-01-17 RX ADMIN — OXYCODONE 5 MG: 5 TABLET ORAL at 08:52

## 2024-01-17 RX ADMIN — NICOTINE 14 MG: 14 PATCH TRANSDERMAL at 05:02

## 2024-01-17 RX ADMIN — ENOXAPARIN SODIUM 40 MG: 100 INJECTION SUBCUTANEOUS at 16:16

## 2024-01-17 RX ADMIN — OXYCODONE 5 MG: 5 TABLET ORAL at 14:34

## 2024-01-17 ASSESSMENT — ENCOUNTER SYMPTOMS
VOMITING: 0
MYALGIAS: 1
CONSTIPATION: 0
HEMOPTYSIS: 0
NAUSEA: 0
FEVER: 0
FALLS: 1
INSOMNIA: 0
HEADACHES: 0
CHILLS: 0
DIZZINESS: 0
NERVOUS/ANXIOUS: 0
ABDOMINAL PAIN: 0
BLOOD IN STOOL: 0
COUGH: 0
DIARRHEA: 0
SHORTNESS OF BREATH: 0

## 2024-01-17 ASSESSMENT — PAIN DESCRIPTION - PAIN TYPE
TYPE: ACUTE PAIN;SURGICAL PAIN
TYPE: ACUTE PAIN;SURGICAL PAIN
TYPE: ACUTE PAIN
TYPE: ACUTE PAIN;SURGICAL PAIN
TYPE: ACUTE PAIN
TYPE: ACUTE PAIN

## 2024-01-17 ASSESSMENT — GAIT ASSESSMENTS
ASSISTIVE DEVICE: FRONT WHEEL WALKER
DISTANCE (FEET): 2
GAIT LEVEL OF ASSIST: CONTACT GUARD ASSIST

## 2024-01-17 ASSESSMENT — COGNITIVE AND FUNCTIONAL STATUS - GENERAL
STANDING UP FROM CHAIR USING ARMS: A LITTLE
WALKING IN HOSPITAL ROOM: A LOT
TURNING FROM BACK TO SIDE WHILE IN FLAT BAD: A LOT
CLIMB 3 TO 5 STEPS WITH RAILING: TOTAL
SUGGESTED CMS G CODE MODIFIER MOBILITY: CL
MOVING TO AND FROM BED TO CHAIR: UNABLE
MOVING FROM LYING ON BACK TO SITTING ON SIDE OF FLAT BED: A LOT
MOBILITY SCORE: 11

## 2024-01-17 NOTE — PROGRESS NOTES
68yoF with left displaced, comminuted supracondylar femur fracture s/p ORIF 1/12.      S: Doing okay, no complaints, happy to be discharging to home today    O:    Vitals:    01/17/24 0348   BP: (!) 144/79   Pulse: 88   Resp: 16   Temp: 36.1 °C (97 °F)   SpO2: 98%     Exam:  General-NAD, alert and following commands  LLE-thigh dressing c/d/I, NVI distally    A: 68yoF with left displaced, comminuted supracondylar femur fracture s/p ORIF 1/12.    Recs:  --TTWB LLE  --knee ROM as tolerated, no knee brace needed  --continue PT/OT for mobilization   --continue lovenox and SCDs, okay for ASA 81mg PO BID as outpatient if otherwise clinically appropriate  --fu 2 weeks postop

## 2024-01-17 NOTE — PREADMISSION SCREENING NOTE
Pre-Admission Screening Form    Patient Information:   Name: Windy León     MRN: [id number]       : 1955      Age: 68 y.o.   Gender: female      Race: White [7]       Marital Status: Single [1]  Family Contact: Unknown,Kelsea        Relationship: Friend [5]  Home Phone:            Cell Phone: [phone]  Advanced Directives: None  Code Status:  FULL  Current Attending Provider: Pablo Almodovar M.D.  Referring Physician: Dr. Guerrero     Physiatrist Consult: Dr. Lopez       Referral Date: 1/15/24  Primary Payor Source:  [employer] MEDICARE  Secondary Payor Source:      Medical Information:   Date of Admission to Acute Care [facility]2024  Room Number: T320/00  Rehabilitation Diagnosis: 0008.2 - Orthopaedic Disorders: Status Post Femur (Shaft) Fracture  Immunization History   Administered Date(s) Administered    MODERNA SARS-COV-2 VACCINE (12+) 2021, 2021    Pneumococcal polysaccharide vaccine (PPSV-23) 2014    Tuberculin Skin Test 10/12/2010     Allergies   Allergen Reactions    Sulfa Drugs Itching and Nausea     Past Medical History:   Diagnosis Date    Arthritis     Heart burn     Psychiatric problem      Past Surgical History:   Procedure Laterality Date    ORIF, FRACTURE, FEMUR Left 2024    Procedure: ORIF, FRACTURE, DISTAL FEMUR;  Surgeon: Edi Amezquita M.D.;  Location: SURGERY [facility];  Service: Orthopedics    HIP ARTH ANTERIOR TOTAL Left 2016    Procedure: HIP ARTHROPLASTY ANTERIOR TOTAL conversion ;  Surgeon: Samuel Ocampo M.D.;  Location: [facility];  Service:     OTHER CARDIAC SURGERY  2015    ablation    OTHER ORTHOPEDIC SURGERY      Left hip pinning       History Leading to Admission, Conditions that Caused the Need for Rehab (CMS):     Hospital Medicine History & Physical Note     Date of Service  2024     Primary Care Physician  Yoel Mosher M.D.     Consultants  Dr Bia Chandler      Code Status  Full Code     Chief Complaint       Chief  Complaint   Patient presents with    GLF       MGLF on ice today, denies hitting head / LOC    Leg Injury       LLE swelling / pain in L thigh s/p GLF today, hx osteoporosis and L hip replacement, +CMS distal to injury      History of Presenting Illness  Windy León is a 68 y.o. female with a past medical history of anxiety, tobacco dependence and a BMI of 29 who presented 1/12/2024 with left hip pain after a fall.  Patient reports that the pain is severe rated as 10/10.  The pain is worse with attempting to move or bear weight on the left lower extremity.  The patient reports having a mechanical fall landing on the left side.  She denies hitting her head or losing consciousness..     I discussed the plan of care with emergency department physician, the patient     Review of Systems  Review of Systems   Constitutional:  Negative for chills and fever.   Eyes:  Negative for discharge and redness.   Respiratory:  Negative for cough, shortness of breath and stridor.    Cardiovascular:  Negative for chest pain and leg swelling.   Gastrointestinal:  Negative for abdominal pain and vomiting.   Genitourinary:  Negative for flank pain.   Musculoskeletal:  Positive for falls and joint pain. Negative for myalgias.   Skin: Negative.    Neurological:  Negative for focal weakness.   Endo/Heme/Allergies:  Does not bruise/bleed easily.   Psychiatric/Behavioral:  The patient is not nervous/anxious.       Past Medical History   has a past medical history of Arthritis, Heart burn, and Psychiatric problem.     Surgical History   has a past surgical history that includes other cardiac surgery (2015); other orthopedic surgery; and hip arth anterior total (Left, 5/19/2016).      Family History  family history is not on file.      Social History   reports that she has been smoking cigarettes. She has never used smokeless tobacco. She reports that she does not drink alcohol and does not use drugs.     Allergies       Allergies   Allergen  Reactions    Sulfa Drugs Itching and Nausea      Medications  Prior to Admission Medications   Prescriptions Last Dose Informant Patient Reported? Taking?   ALPRAZolam (XANAX) 0.5 MG Tab     Yes No   Sig: Take 0.5 mg by mouth at bedtime as needed for Sleep.   CALCIUM PO   Patient Yes No   Sig: Take 2 Tabs by mouth every day.   Diclofenac Sodium 1 % Gel     No No   Sig: Apply 2 g to skin as directed 3 times a day as needed.   MAGNESIUM PO   Patient Yes No   Sig: Take 2 Tabs by mouth every day.   cyclobenzaprine (FLEXERIL) 10 MG Tab     No No   Sig: Take 1 Tab by mouth 3 times a day as needed.   fluoxetine (PROZAC) 40 MG capsule   Patient Yes No   Sig: Take 40 mg by mouth every day.   hydrocodone/acetaminophen (NORCO)  MG Tab     No No   Sig: Take 1-2 Tabs by mouth every 6 hours as needed for Severe Pain. Indications: Moderate to Moderately Severe Pain   ibuprofen (MOTRIN) 600 MG Tab     Yes No   Sig: Take 600 mg by mouth every day.   lidocaine (LIDODERM) 5 % Patch     No No   Sig: Apply 1 Patch to skin as directed every 24 hours.   omeprazole (PRILOSEC) 20 MG delayed-release capsule   Patient Yes No   Sig: Take 20 mg by mouth every day.   therapeutic multivitamin-minerals (THERAGRAN-M) Tab   Patient Yes No   Sig: Take 1 Tab by mouth every day.      Facility-Administered Medications: None      Physical Exam  Temp:  [36.2 °C (97.2 °F)-36.4 °C (97.6 °F)] 36.4 °C (97.5 °F)  Pulse:  [] 96  Resp:  [14-20] 16  BP: (132-225)/() 132/83  SpO2:  [93 %-98 %] 94 %  Blood Pressure : (!) 204/95   Temperature: 36.3 °C (97.4 °F)   Pulse: 87   Respiration: 14   Pulse Oximetry: 96 %      Physical Exam  Constitutional:       General: She is not in acute distress.  HENT:      Head: Normocephalic and atraumatic.      Right Ear: External ear normal.      Left Ear: External ear normal.      Nose: No congestion or rhinorrhea.      Mouth/Throat:      Mouth: Mucous membranes are moist.      Pharynx: No oropharyngeal exudate  "or posterior oropharyngeal erythema.   Eyes:      General: No scleral icterus.        Right eye: No discharge.         Left eye: No discharge.      Conjunctiva/sclera: Conjunctivae normal.      Pupils: Pupils are equal, round, and reactive to light.   Cardiovascular:      Heart sounds:      No friction rub. No gallop.   Pulmonary:      Effort: Pulmonary effort is normal.   Abdominal:      General: Abdomen is flat. There is no distension.      Tenderness: There is no guarding.   Musculoskeletal:         General: No swelling.      Cervical back: Neck supple. No rigidity. No muscular tenderness.      Right lower leg: No edema.      Left lower leg: No edema.   Skin:     Capillary Refill: Capillary refill takes 2 to 3 seconds.      Coloration: Skin is not jaundiced or pale.      Findings: No bruising or erythema.   Neurological:      Mental Status: She is alert and oriented to person, place, and time.   Psychiatric:         Mood and Affect: Mood normal.         Judgment: Judgment normal.         Laboratory:      Recent Labs     01/12/24  0905   WBC 9.1   RBC 4.79   HEMOGLOBIN 15.0   HEMATOCRIT 45.5   MCV 95.0   MCH 31.3   MCHC 33.0   RDW 43.0   PLATELETCT 258   MPV 10.2          Recent Labs     01/12/24  0905   SODIUM 134*   POTASSIUM 4.5   CHLORIDE 99   CO2 27   GLUCOSE 100*   BUN 15   CREATININE 0.65   CALCIUM 9.1          Recent Labs     01/12/24  0905   ALTSGPT 18   ASTSGOT 21   ALKPHOSPHAT 98   TBILIRUBIN <0.2   GLUCOSE 100*          No results for input(s): \"NTPROBNP\" in the last 72 hours.      No results for input(s): \"TROPONINT\" in the last 72 hours.     Imaging:  CT-KNEE W/O PLUS RECONS LEFT   Final Result       Comminuted distal femur fracture. No definite extension to the articular surface.       DX-KNEE 3 VIEWS LEFT   Final Result       1.  Comminuted and impacted fracture of the distal left femur without evidence of articular extension. Persistent posterior angulation post reduction.   2.  Osteopenia.     "   DX-KNEE 3 VIEWS LEFT   Final Result       1.  Partially visualized, comminuted fracture of the distal left femur without evidence of articular extension.   2.  Osteopenia.       DX-FEMUR-2+ LEFT   Final Result       1.  Severely comminuted fracture of the distal femur. Recommend knee radiographs to more confidently exclude extension to the knee joint.   2.  Prior LEFT hip arthroplasty   3.  Osteopenia   4.  Osteoarthritis       DX-PELVIS-1 OR 2 VIEWS   Final Result       1.  Deformity of the RIGHT pubic bone compatible with age indeterminate trauma. This could be acute or chronic fracture. Comparison with any prior images would be helpful.   2.  Prior LEFT hip arthroplasty   3.  Osteopenia   4.  Osteoarthritis       DX-KNEE 3 VIEWS LEFT    (Results Pending)   DX-PORTABLE FLUORO > 1 HOUR    (Results Pending)      I patient reviewed patient EKG, it shows atrial paced complexes with a rate of 93, there is no ST elevation.  There is no ST depression.  QTc is 478.      Assessment/Plan:  Justification for Admission Status  I anticipate this patient will require at least two midnights for appropriate medical management, necessitating inpatient admission because patient has femoral fracture.     Patient will need a Med/Surg bed on ORTHOPEDICS service .  The need is secondary to the patient has a femoral fracture..     * Severely comminuted fracture of the left distal femur- (present on admission)  Assessment & Plan  Orthopedics consulted, anticipate need for operative treatment    Multimodal pain control  Consider physical and Occupational Therapy, pharmacologic prophylaxis when okay with orthopedics   Revised Cardiac Risk Index for Pre-Operative Risk score of:   2 points Class III Risk  The patient has a 10.1 % 30-day risk of death, MI, or cardiac arrest   The patient has an elevated BMI of 29.  She is at higher risk for postoperative hypoxia. I will order continuous pulse oximetry  Emphasized incentive spirometry,  consider BiPAP/CPAP postprocedure       Hypertensive urgency- (present on admission)  Assessment & Plan  Likely secondary to severe pain  I will place on multimodal pain medications   I will start as needed labetalol for extreme hypertension  Consider starting scheduled antihypertensive medications according to blood pressure trend     Dehydration- (present on admission)  Assessment & Plan  Likely secondary to reduced oral intake.   Encourage oral intake as tolerated, antiemetics as needed.  Intravenous hydration until adequate oral intake is achieved.      Hyponatremia- (present on admission)  Assessment & Plan  Hypovolemic hyponatremia  I expect Na to improve with IVF      BMI 29.0-29.9,adult- (present on admission)  Assessment & Plan  The patient has an elevated BMI of 29.  She is at higher risk for postoperative hypoxia. I will order continuous pulse oximetry  Emphasized incentive spirometry, consider BiPAP/CPAP postprocedure       Tobacco dependence- (present on admission)  Assessment & Plan  I spent 4 minutes on Tobacco cessation education and counseling.   I Discussed the benefits of quitting smoking and risks of continued smoking including cardiovascular disease, cancer and COPD.   Discussed the option of nicotine patch.      Anxiety- (present on admission)  Assessment & Plan  Resume fluoxetine   As needed alprazolam        VTE prophylaxis: SCDs/TEDs          Physical Medicine and Rehabilitation Consultation                                                                                  Date of initial consultation: 1/16/2024  Requesting provider: ordered by Logan Guerrero M.D. at 01/16/24 0916    Consulting provider: Edel Lopez D.O.  Reason for consultation: assess for acute inpatient rehab appropriateness  LOS: 4 Day(s)     Chief complaint: LLE pain after GLF      HPI: The patient is a 68 y.o. female with a past medical history of tobacco dependence, history of L CARLYN, and anxiety;  who presented  on 1/12/2024  9:00 AM with left hip pain after a GLF. Per documentation, patient had a mechanical fall, landing on her left side. She did not hit her head, but was unable to stand. Upon eval in the ED, images showed a comminuted fracture of the distal left femur. Ortho was consulted, and patient was taken to the OR on 1/12 for open treatment with internal fixation of R supracondylar femur fracture without intercondylar extension performed by Dr. Amezquita. Patient is TTWB LLE. Patient's hospital course has been notable for ABLA and Hypokalemia. Patient has not been able to tolerate mobility with therapies yet.      Patient seen and examined at bedside, patient reports she feels ok, but is overwhelmed. Patient tearful expressing that she is very independent and has not needed help in the past. Reports very supportive group of friends, main friend to help is Nidia. Patient reports LLE pain, but otherwise feeling ok. Does not report HA, lightheadedness, SOB, CP, abdominal pain, or changes in numbness/tingling/weakness.         Social Hx:  Patient lives alone in a mobile home with 5 MOMO. Has a friend that can stop by multiple times a day   5 MOMO  At prior level of function patient was Independent with mobility and ADLs.            Employment: [employer]   Tobacco: daily cigarette use   Alcohol: Denies   Drugs: Denies      THERAPY:  Restrictions: Fall Risk, TTWB LLE   PT: Functional mobility   1/15 Min A bed mobility, unable to tolerate gait, unable to tolerate sit to stand, increased pain at EOB      OT: ADLs  1/15 Mod A sit to stand, Mod A transfers, Max A lower body dressing      SLP:   None      IMAGING:  CT-KNEE W/O PLUS RECONS LEFT   Final Result       Comminuted distal femur fracture. No definite extension to the articular surface.       DX-KNEE 3 VIEWS LEFT   Final Result       1.  Comminuted and impacted fracture of the distal left femur without evidence of articular extension. Persistent posterior  "angulation post reduction.   2.  Osteopenia.       DX-KNEE 3 VIEWS LEFT   Final Result       1.  Partially visualized, comminuted fracture of the distal left femur without evidence of articular extension.   2.  Osteopenia.       DX-FEMUR-2+ LEFT   Final Result       1.  Severely comminuted fracture of the distal femur. Recommend knee radiographs to more confidently exclude extension to the knee joint.   2.  Prior LEFT hip arthroplasty   3.  Osteopenia   4.  Osteoarthritis       DX-PELVIS-1 OR 2 VIEWS   Final Result       1.  Deformity of the RIGHT pubic bone compatible with age indeterminate trauma. This could be acute or chronic fracture. Comparison with any prior images would be helpful.   2.  Prior LEFT hip arthroplasty   3.  Osteopenia   4.  Osteoarthritis         PROCEDURES:  1/12 Open treatment with internal fixation of right supracondylar femur fracture without intercondylar extension by Dr. Amezquita        Allergies:       Allergies   Allergen Reactions    Sulfa Drugs Itching and Nausea         Physical Exam:  Vitals: /89   Pulse 93   Temp 36.4 °C (97.6 °F) (Temporal)   Resp 16   Ht 1.6 m (5' 3\")   Wt 75.8 kg (167 lb)   SpO2 95%   Gen: NAD, laying comfortably in bed   Head:  NC/AT  Eyes/ Nose/ Mouth: PERRLA, moist mucous membranes  Cardio: RRR, good distal perfusion, warm extremities  Pulm: normal respiratory effort, no cyanosis, on RA   Abd: Soft NTND, negative borborygmi   Ext: No peripheral edema. No calf tenderness. No clubbing. LE acewrap in place      Mental status:  A&Ox4 (person, place, date, situation) answers questions appropriately follows commands  Speech: fluent, no aphasia or dysarthria     CRANIAL NERVES:  2,3: visual acuity grossly intact, PERRL  3,4,6: EOMI bilaterally, no nystagmus or diplopia  5: sensation intact to light touch bilaterally and symmetric  7: no facial asymmetry        Motor:                            Upper Extremity  Myotome R L   Shoulder flexion C5 5/5 5/5 "   Elbow flexion C5 5/5 5/5   Wrist extension C6 5/5 5/5   Elbow extension C7 5/5 5/5   Finger flexion C8 5/5 5/5   Finger abduction T1 5/5 5/5      Lower Extremity Myotome R L   Hip flexion L2 5/5 2/5   Knee extension L3 5/5 2/5   Ankle dorsiflexion L4 5/5 4/5   Toe extension L5 5/5 4/5   Ankle plantarflexion S1 5/5 4/5         Sensory:   intact to light touch through out     DTRs: 2+ in bilateral  biceps  No clonus at bilateral ankles  Negative Morales b/l      Tone: no spasticity noted, no cogwheeling noted     Coordination:   intact finger to nose bilaterally  intact fine motor with fingers bilaterally        Labs: Reviewed and significant for         Recent Labs     01/14/24  0554 01/15/24  0807 01/16/24  0021   RBC 3.32* 2.97* 2.78*   HEMOGLOBIN 10.3* 9.4* 8.9*   HEMATOCRIT 32.1* 27.9* 25.9*   PLATELETCT 191 194 211            Recent Labs     01/14/24  0554 01/15/24  0807 01/16/24  0021   SODIUM 133* 135 135   POTASSIUM 4.1 3.3* 3.3*   CHLORIDE 101 101 99   CO2 24 27 24   GLUCOSE 126* 116* 123*   BUN 8 6* 8   CREATININE 0.40* 0.30* 0.41*   CALCIUM 8.1* 8.2* 8.1*      Recent Results         Recent Results (from the past 24 hour(s))   CBC WITHOUT DIFFERENTIAL     Collection Time: 01/16/24 12:21 AM   Result Value Ref Range     WBC 10.7 4.8 - 10.8 K/uL     RBC 2.78 (L) 4.20 - 5.40 M/uL     Hemoglobin 8.9 (L) 12.0 - 16.0 g/dL     Hematocrit 25.9 (L) 37.0 - 47.0 %     MCV 93.2 81.4 - 97.8 fL     MCH 32.0 27.0 - 33.0 pg     MCHC 34.4 32.2 - 35.5 g/dL     RDW 41.1 35.9 - 50.0 fL     Platelet Count 211 164 - 446 K/uL     MPV 9.7 9.0 - 12.9 fL   Renal Function Panel     Collection Time: 01/16/24 12:21 AM   Result Value Ref Range     Sodium 135 135 - 145 mmol/L     Potassium 3.3 (L) 3.6 - 5.5 mmol/L     Chloride 99 96 - 112 mmol/L     Co2 24 20 - 33 mmol/L     Glucose 123 (H) 65 - 99 mg/dL     Creatinine 0.41 (L) 0.50 - 1.40 mg/dL     Bun 8 8 - 22 mg/dL     Calcium 8.1 (L) 8.5 - 10.5 mg/dL     Correct Calcium 8.9 8.5 -  10.5 mg/dL     Phosphorus 2.5 2.5 - 4.5 mg/dL     Albumin 3.0 (L) 3.2 - 4.9 g/dL   ESTIMATED GFR     Collection Time: 01/16/24 12:21 AM   Result Value Ref Range     GFR (CKD-EPI) 106 >60 mL/min/1.73 m 2               ASSESSMENT:  Patient is a 68 y.o. female admitted with left      IGC Code / Diagnosis to Support: 0008.2 - Orthopaedic Disorders: Status Post Femur (Shaft) Fracture     Rehabilitation: Impaired ADLs and mobility  Patient is a good candidate for inpatient rehab based on needs for PT, OT; pending confirmation of DC support from friends.   Barriers to transfer include: Insurance authorization, TCCs to verify disposition, medical clearance and bed availability      Additional Recommendations:  Left femur shaft fracture   - sustained during GLF   - images showed left distal femur shaft fracture  - ortho consulted   - 1/12 open treatment and internal fixation of R supracondylar femur fracture  by Dr. Amezquita   - TTWB LLE   - continue with PT/OT      ABLA  - post op drop in Hgb   - 1/16  Hgb 8.9 , continues to trend down s/p IVF   - primary team monitoring CBC      Hypokalemia   -1/16 K 3.3   - primary team monitoring electrolytes      Anxiety/Depression   - on home dose fluoxetine      Dispo:  - patient is currently functioning below their level of baseline, recommend post acute rehab  - recommend IRF level therapy with 3hr of therapy 5 days per week  - piror to acceptance to IRF, will need confirmation of DC support   - TCC to assist with insurance auth and DC support            Medical Complexity:  Left femur shaft fracture   ABLA   Hypokalemia   Anxiety/Depression   Impaired mobility and ADLs         DVT PPX: Lovenox         Thank you for allowing us to participate in the care of this patient.      Patient was seen for 81 minutes on unit/floor of which > 50% of time was spent on counseling and coordination of care regarding the above, including prognosis, risk reduction, benefits of treatment, and options  for next stage of care.     Edel Lopez D.O.   Physical Medicine and Rehabilitation      Please note that this dictation was created using voice recognition software. I have made every reasonable attempt to correct obvious errors, but there may be errors of grammar and possibly content that I did not discover before finalizing the note.           DATE OF SERVICE:  01/12/2024      ORTHOPEDIC CONSULTATION     REQUESTING PHYSICIAN:  Kb Garcia MD, emergency department.     REASON FOR CONSULTATION:  Left distal femur fracture.     CHIEF COMPLAINT:  Left knee pain.     HISTORY OF PRESENT ILLNESS:  The patient is 68 years old.  She has a history   of osteoporosis.  She also has a history of left total hip arthroplasty.  She   slipped on ice, injuring her left knee.  She presented to [facility], found to   have a comminuted displaced supracondylar femur fracture.  She was placed in a   knee immobilizer and admitted to the hospitalist service.  I was consulted to   provide treatment recommendations.  She denies other injuries.     PAST MEDICAL HISTORY:  ALLERGIES:  TO SULFA MEDICATIONS.     PAST MEDICAL DIAGNOSES: Osteoporosis, psychiatric disorder, heartburn and   arthritis.     PAST SURGICAL HISTORY: Left total knee arthroplasty in 2016, cardiac surgery.     SOCIAL HISTORY:  The patient smokes about half pack of cigarettes a day.    Denies alcohol and illicit drug use     PHYSICAL EXAMINATION:  VITAL SIGNS:  Temperature 97.2, heart rate 100, respiratory rate 18, blood   pressure 148/72, pulse oximetry 94% on 2 liters nasal cannula.  GENERAL APPEARANCE: The patient is in mild amount of distress due to pain in   left thigh.  She is alert and following commands.  MUSCULOSKELETAL:  Left lower extremity: She has a knee immobilizer in place.    She is able dorsi and plantarflex the foot and flex and extend toes.  She has   palpable dorsalis pedis and posterior tibial pulses.  There is no evidence of   obvious traumatic  deformity of the right lower or bilateral upper extremities   which are grossly neurovascularly intact     DIAGNOSTIC IMAGING:  Plain x-rays of the left femur as well as CT of the left   knee show comminuted displaced supracondylar femur fracture.  There is no   obvious intercondylar extension.  There is a left total hip arthroplasty press   fit within the proximal femur without obvious complication seen.     ASSESSMENT:  A 68-year-old female with left comminuted displaced supracondylar   femur fracture.     RECOMMENDATIONS:  1.  I discussed these findings with the patient and I do recommend surgical   reduction and fixation.  Wait for the opportunity to get her to the operating   room today for surgical management, which I feel would help facilitate and   expedite her rehabilitation.  2.  We did discuss risks of surgery including infection, knee stiffness,   nonunion, malunion, potential for injury to neurovascular structures, blood   loss, possibly requiring transfusion, DVT, pulmonary embolism, implant   prominence, possibly requiring removal and general risks of anesthesia.  She   expressed understanding and wished to proceed with surgery when possible.  3.  She continued to be n.p.o. and nonweightbearing left lower extremity in   her knee immobilizer. I will make preparations to get her to the operating   room later this evening for surgical management.           ______________________________  MD KADEEM Kidd/MORA/LAMINE     DD:  01/12/2024 18:48  DT:  01/12/2024 19:27     Job#:  [id number]          DATE OF SERVICE:  01/12/2024      PREOPERATIVE DIAGNOSIS:  Left comminuted displaced supracondylar femur   fracture.     POSTOPERATIVE DIAGNOSIS:  Left comminuted displaced supracondylar femur   fracture.     PROCEDURE PERFORMED:  Open treatment with internal fixation of right   supracondylar femur fracture without intercondylar extension.     SURGEON:  Edi Amezquita MD     ANESTHESIOLOGISTS:  1.   Rima Van.  2.  Chad Kuo MD     ANESTHESIA:  General.     ESTIMATED BLOOD LOSS:  200 mL.     IMPLANTS:  Mendez lateral distal femoral locking plate with combination of   locking and nonlocking screws.     INDICATIONS FOR PROCEDURE:  The patient is 68 years old.  She slipped on ice,   injuring her left thigh.  She sustained a left comminuted displaced   supracondylar femur fracture.  She has a history of a left total hip   arthroplasty on that side as well.  I was consulted by treatment   recommendations and she was evaluated for admission to the hospitalist   service, felt to be medically optimized for surgical management.  I   recommended surgical reduction and fixation.  We discussed risks and   alternatives to surgery.  She expressed understanding and wished to proceed   with surgery as outlined above.     DESCRIPTION OF PROCEDURE:  The patient was met in the preoperative holding   area.  Her surgical site was signed.  Her consent was confirmed to be   accurate.  She was taken back to the operating room and general anesthesia was   induced.  The left thigh and lower extremity were provisionally cleansed with   isopropyl alcohol and then prepped and draped in the usual sterile fashion.    A formal timeout was performed to confirm patient's correct name, correct   surgical site, correct procedure and correct laterality.  A lateral incision   was made centered over the supracondylar femur area with scalpel down through   skin.  Dissection was carried with Bovie cautery through subcutaneous tissue   down through the iliotibial band.  Vastus lateralis muscle was elevated   anteriorly.  I localized the area of extensive comminution in the   supracondylar femur area, traction and placing a bump posteriorly and improve   the overall alignment.  I then slid a submuscular distal femoral locking plate   with appropriate position distally, pinned it into place, proximally, I made   a counter incision, confirmed  it was well seated on bone and overlapping at   the tip of the stem of the femoral prosthesis and pinned it into place   anterior to the plate and pulled little bit more traction until I felt the   fracture was out to length.  I used a collinear reduction clamp to fine tune   the fracture reduction distally and placed a bicortical nonlocking screw   proximally and then several locking screws distally and three more bicortical   nonlocking screws proximally using percutaneous technique.  I then removed all   the K-wires, reduction forceps, the distal segment sprung medially just a   little bit but overall I felt the overall length of the fracture and   rotational profile despite significant comminution was overall acceptable and   the position of the implant was acceptable as well.  The wound was thoroughly   irrigated with normal saline and repaired the IT band with 0 Vicryl,   subcutaneous layers with 0 Vicryl, 2-0 Vicryl and the skin edges with staples   after the wounds were thoroughly irrigated with normal saline.  I placed   sterile dressings.  We removed the drapes and removed the bump under her left   buttock and she had symmetric rotational profile to the bilateral lower   extremities.  I then placed a well-padded compressive dressing from foot to   thigh.  She was then awoken from anesthesia and transferred on the [facility] and   taken to postanesthesia care unit in stable condition.     PLAN:  1.  The patient will be re-admitted to the hospitalist service postop.  2.  She should be toe touch weightbearing to left lower extremity.  3.  She can perform knee range of motion as tolerated.  No knee brace as   needed.  4.  She will need Ancef for 2 doses postop for infection prophylaxis.  5.  She should work with physical and occupational therapy for mobilization   and gait training.  6.  Okay to start Lovenox tomorrow morning or equivalent if otherwise   clinically appropriate and should continue SCDs for DVT  prophylaxis in the   meantime.  7.  Ultimately, she should follow up with me 2 weeks postop for routine wound   check, staple removal and x-rays, 2 views of left femur.           ______________________________  MD KADEEM Kidd/MELISSA/MATIAS     DD:  01/12/2024 20:50  DT:  01/12/2024 22:03     Job#:  [id number]         Immediate Post OP Note     PreOp Diagnosis: Left comminuted, displaced supracondylar femur fracture        PostOp Diagnosis: same        Procedure(s):  ORIF, FRACTURE, DISTAL FEMUR - Wound Class: Clean     Surgeon(s):  Edi Amezquita M.D.     Anesthesiologist/Type of Anesthesia:  Anesthesiologist: Winifred Patel M.D.; Chad Kuo M.D./General     Surgical Staff:  Circulator: Nehemias Phillips R.N.  Scrub Person: Mily Lopez  Radiology Technologist: Nadine Urbina; Carola Raya     Specimens removed if any:  * No specimens in log *     Estimated Blood Loss: 200cc     Findings: see dictation     Complications: none known     PLAN:  --readmit postop  --TTWB LLE  --knee ROM as tolerated, no knee brace needed  --ancef x 2 doses postop  --PT/OT for mobilization ASAP  --okay to start lovenox or equivalent tomorrow am, continue SCDs  --fu 2 weeks postop           1/12/2024 8:46 PM Jennifer Hays    Co-morbidities:  See PMH  Potential Risk - Complications: Contractures, Deep Vein Thrombosis, Incontinence, Pain, Perceptual Impairment, Pneumonia, Pressure Ulcer, and Urinary Tract Infection  Level of Risk: High    Ongoing Medical Management Needed (Medical/Nursing Needs):   Patient Active Problem List    Diagnosis Date Noted    Severely comminuted fracture of the left distal femur 01/12/2024    Tobacco dependence 01/12/2024    Hypertensive urgency 01/12/2024    BMI 29.0-29.9,adult 01/12/2024    Anxiety 01/12/2024    Hyponatremia 01/12/2024    Dehydration 01/12/2024    Gastroesophageal reflux disease 01/12/2024    Primary osteoarthritis of left hip 05/19/2016    Palpitations  "11/18/2011    Tobacco abuse 11/18/2011     A/o  Current Vital Signs:   Temperature: 36.4 °C (97.5 °F) Pulse: 86 Respiration: 16 Blood Pressure : (!) 141/93  Weight: 75.8 kg (167 lb) Height: 160 cm (5' 3\")  Pulse Oximetry: 91 % O2 (LPM): 0.5      Completed Laboratory Reports:  Recent Labs     01/15/24  0807 01/16/24  0021 01/17/24  0739   WBC 10.8 10.7 6.1   HEMOGLOBIN 9.4* 8.9* 9.0*   HEMATOCRIT 27.9* 25.9* 26.9*   PLATELETCT 194 211 265   SODIUM 135 135 140   POTASSIUM 3.3* 3.3* 3.5*   BUN 6* 8 6*   CREATININE 0.30* 0.41* 0.30*   ALBUMIN 2.9* 3.0* 2.6*   GLUCOSE 116* 123* 109*     Additional Labs: Not Applicable    Prior Living Situation:   Housing / Facility: Mobile Home  Steps Into Home: 5  Steps In Home: 0  Lives with - Patient's Self Care Capacity: Alone and Able to Care For Self  Equipment Owned: Front-Wheel Walker, Crutches, Wheelchair, Tub / Shower Seat    Prior Level of Function / Living Situation:   Physical Therapy: Prior Services: Home-Independent  Housing / Facility: Mobile Home  Steps Into Home: 5  Steps In Home: 0  Rail: Both Rail (Steps into Home)  Bathroom Set up: Walk In Shower, Shower Chair  Equipment Owned: Front-Wheel Walker, Crutches, Wheelchair, Tub / Shower Seat  Lives with - Patient's Self Care Capacity: Alone and Able to Care For Self  Bed Mobility: Independent  Transfer Status: Independent  Ambulation: Independent  Assistive Devices Used: None  Stairs: Independent  Current Level of Function:   Gait Level Of Assist: Unable to Participate  Supine to Sit: Minimal Assist (HOB elevated)  Sit to Supine:  (up to chair post)  Scooting: Minimal Assist (seated; requires LLE to be supported)  Rolling: Standby Assist  Skilled Intervention: Compensatory Strategies, Postural Facilitation, Tactile Cuing, Verbal Cuing  Comments: required assist with LLE  Sit to Stand: Minimal Assist  Bed, Chair, Wheelchair Transfer: Minimal Assist  Toilet Transfers:  (NT; declined need during session)  Transfer Method: " Stand Pivot (FWW)  Skilled Intervention: Compensatory Strategies, Postural Facilitation, Tactile Cuing, Verbal Cuing  Sitting in Chair: >10 min in recliner, up post  Sitting Edge of Bed: 5 min  Standing: <1 min for transfer  Occupational Therapy:   Self Feeding: Independent  Grooming / Hygiene: Independent  Bathing: Independent  Dressing: Independent  Toileting: Independent  Medication Management: Independent  Laundry: Independent  Kitchen Mobility: Independent  Finances: Independent  Home Management: Independent  Shopping: Independent  Prior Level Of Mobility: Independent Without Device in Community, Independent Without Device in Home  Driving / Transportation: Driving Independent  Prior Services: Home-Independent  Housing / Facility: Mobile Home  Occupation (Pre-Hospital Vocational): Employed Full Time ( at [employer])  Current Level of Function:   Eating: Modified Independent  Lower Body Dressing:  (declined, but able to reach R foot without difficulty once up to recliner)  Toileting:  (declined need, pt using Purewick which had failed in bed)  Skilled Intervention: Compensatory Strategies, Verbal Cuing  Speech Language Pathology:      Rehabilitation Prognosis/Potential: Good  Estimated Length of Stay: 10-14 days    Nursing:      Masha in Place - female wick    Scope/Intensity of Services Recommended:  Physical Therapy: 1.5 hr / day  5 days / week. Therapeutic Interventions Required: Maximize Endurance, Mobility, Strength, and Safety  Occupational Therapy: 1.5 hr / day 5 days / week. Therapeutic Interventions Required: Maximize Self Care, ADLs, IADLs, and Energy Conservation  Rehabilitation Nursin/. Therapeutic Interventions Required: Monitor Pain, Skin, Vital Signs, Intake and Output, Labs, Safety, and Family Training  Rehabilitation Physician: 3 - 5 days / week. Therapeutic Interventions Required: Medical Management    She requires 24-hour rehabilitation nursing to manage bowel and bladder function,  skin care, surgical incision, nutrition and fluid intake, pulmonary hygiene, pain control, safety, medication management, and patient/family goals. In addition, rehabilitation nursing will reiterate and reinforce therapy skills and equipment use, including ADLs, as well as provide education to the patient and family. Windy León is willing to participate in and is able to tolerate the proposed plan of care.    Rehabilitation Goals and Plan (Expected frequency & duration of treatment in the IRF):   Return to the Community, Modified Independent Level of Care, Minimal Assist Level of Care, and Family Able to Provide 24/7 Assistance  Anticipated Date of Rehabilitation Admission: 1/17/24  Patient/Family oriented IRF level of care/facility/plan: Yes  Patient/Family willing to participate in IRF care/facility/plan: Yes  Patient able to tolerate IRF level of care proposed: Yes  Patient has potential to benefit IRF level of care proposed: Yes  Comments: Not Applicable    Special Needs or Precautions - Medical Necessity:  Safety Concerns/Precautions:  Fall Risk / High Risk for Falls and Balance  Pain Management  IV Site: Peripheral  Current Medications:    Current Facility-Administered Medications Ordered in Epic   Medication Dose Route Frequency Provider Last Rate Last Admin    ipratropium-albuterol (DUONEB) nebulizer solution  3 mL Nebulization Q4H PRN (RT) Logan Guerrero M.D.        diazePAM (Valium) tablet 5 mg  5 mg Oral Q8HRS PRN Logan Guerrero M.D.   5 mg at 01/14/24 1713    enoxaparin (Lovenox) inj 40 mg  40 mg Subcutaneous DAILY AT 1800 Logan Guerrero M.D.   40 mg at 01/16/24 1608    acetaminophen (Tylenol) tablet 650 mg  650 mg Oral Q6HRS PRN Anne-Marie Dyson M.D.        senna-docusate (Pericolace Or Senokot S) 8.6-50 MG per tablet 2 Tablet  2 Tablet Oral BID Anne-Marie Dyson M.D.   2 Tablet at 01/16/24 0428    And    polyethylene glycol/lytes (Miralax) Packet 1 Packet  1 Packet Oral QDAY PRN Anne-Marie RUIZ  ARMIN Dyson        And    magnesium hydroxide (Milk Of Magnesia) suspension 30 mL  30 mL Oral QDAY PRN Asecheco Dyson M.D.        And    bisacodyl (Dulcolax) suppository 10 mg  10 mg Rectal QDAY PRN Asecheco Dyson M.D.        Pharmacy Consult Request ...Pain Management Review 1 Each  1 Each Other PHARMACY TO DOSE Asem JOSEPH Dyson M.D.        ondansetron (Zofran) syringe/vial injection 4 mg  4 mg Intravenous Q4HRS PRN Asem JOSEPH Dyson M.D.        ondansetron (Zofran ODT) dispertab 4 mg  4 mg Oral Q4HRS PRN Anne-Marie Dyson M.D.        hydrALAZINE (Apresoline) injection 10 mg  10 mg Intravenous Q4HRS PRN Chichom JOSEPH Dyson M.D.   10 mg at 01/12/24 1406    albuterol inhaler 1-2 Puff  1-2 Puff Inhalation Q4H PRN (RT) Chichom JOSEPH Dyson M.D.   2 Puff at 01/15/24 1548    ALPRAZolam (Xanax) tablet 0.5 mg  0.5 mg Oral BID PRN Anne-Marie Dyson M.D.   0.5 mg at 01/16/24 2300    cyclobenzaprine (Flexeril) tablet 5 mg  5 mg Oral TID PRN Anne-Marie Dyson M.D.   5 mg at 01/16/24 1807    FLUoxetine (PROzac) capsule 40 mg  40 mg Oral BID Asem JOSEPH Dyson M.D.   40 mg at 01/17/24 0502    omeprazole (PriLOSEC) capsule 40 mg  40 mg Oral BID Asem JOSEPH Dyson M.D.   40 mg at 01/17/24 0503    nicotine (Nicoderm) 14 MG/24HR 14 mg  14 mg Transdermal Daily-0600 Asem JOSEPH Dyson M.D.   14 mg at 01/17/24 0502    And    nicotine polacrilex (Nicorette) 2 MG piece 2 mg  2 mg Oral Q HOUR PRN Asem JOSEPH Dyson M.D.        NS infusion   Intravenous Continuous Asem JOSEPH Dyson M.D. 83 mL/hr at 01/16/24 1809 New Bag at 01/16/24 1809    labetalol (Normodyne/Trandate) injection 10 mg  10 mg Intravenous Q4HRS PRN Anne-Marie Dyson M.D.        oxyCODONE immediate-release (Roxicodone) tablet 2.5 mg  2.5 mg Oral Q3HRS PRN Anne-Marie Dyson M.D.        Or    oxyCODONE immediate-release (Roxicodone) tablet 5 mg  5 mg Oral Q3HRS PRN Anne-Marie Dyson M.D.   5 mg at 01/17/24 0852    Or    HYDROmorphone (Dilaudid) injection 0.5 mg  0.5 mg Intravenous Q3HRS PRN  Anne-Marie Dyson M.D.   0.5 mg at 01/16/24 2049     No current [facility]-ordered outpatient medications on file.     Diet:   DIET ORDERS (From admission to next 24h)       Start     Ordered    01/12/24 2225  Diet Order Diet: Regular  ALL MEALS        Question:  Diet:  Answer:  Regular    01/12/24 2224                    Anticipated Discharge Destination / Patient/Family Goal:  Destination: Home with Assistance Support System: Friends  Anticipated home health services: OT, PT, and Nursing  Previously used HH service/ provider: Not Applicable  Anticipated DME Needs: [employer]  Outpatient Services: OT and PT  Alternative resources to address additional identified needs:   No future appointments.   Pre-Screen Completed: 1/17/2024 10:59 AM Lisa Lynne

## 2024-01-17 NOTE — THERAPY
"Occupational Therapy  Daily Treatment     Patient Name: Windy León  Age:  68 y.o., Sex:  female  Medical Record #: [id number]  Today's Date: 1/16/2024     Precautions: Toe Touch Weight Bearing Left Lower Extremity    Assessment    Pt seen for OT tx to include: bed mobility, transfer training, seated grooming. Pt received in bed stating linens wet from Purewick fail. Agreed that up to chair was indicated to have linens changed. Pt requires support to LLE during all mobility, but was able to move the rest of her body quite well. She tolerated LLE resting on floor (with knee extended) prior to stand-pivot to chair. Educated on attempting to use BSC with nursing staff versus Purewick. Pt is limited by pain (and fear of pain), but is progressing towards OT goals. Will continue to follow.     Plan    Treatment Plan Status: Continue Current Treatment Plan  Type of Treatment: Self Care / Activities of Daily Living, Adaptive Equipment, Therapeutic Exercises, Therapeutic Activity  Treatment Frequency: 5 Times per Week  Treatment Duration: Until Therapy Goals Met    DC Equipment Recommendations: Unable to determine at this time  Discharge Recommendations: Recommend post-acute placement for additional occupational therapy services prior to discharge home    Subjective    \"I can't move fast. I need to take it really slow.\"      Objective       01/16/24 1711   Other Treatments   Other Treatments Provided Ongoing educationon importance of OOB activity, role of acute rehab in functional recovery   Balance   Sitting Balance (Static) Fair +   Sitting Balance (Dynamic) Fair +   Standing Balance (Static) Fair -   Standing Balance (Dynamic) Poor +   Weight Shift Sitting Fair   Weight Shift Standing Absent   Comments FWW for standing   Bed Mobility    Supine to Sit Minimal Assist  (HOB elevated)   Sit to Supine   (up to chair post)   Scooting Minimal Assist  (seated; requires LLE to be supported)   Activities of Daily Living   Grooming " Modified Independent;Seated  (chair level oral care)   Lower Body Dressing   (declined, but able to reach R foot without difficulty once up to recliner)   Toileting   (declined need, pt using Purewick which had failed in bed)   Functional Mobility   Sit to Stand Minimal Assist   Bed, Chair, Wheelchair Transfer Minimal Assist   Transfer Method Stand Pivot  (FWW)   Mobility Supine > EOB, pivot to bedside recliner   Activity Tolerance   Sitting in Chair >10 min in recliner, up post   Sitting Edge of Bed 5 min   Standing <1 min for transfer   Comments limited by pain   Short Term Goals   Short Term Goal # 1 Pt will complete LB dressing with supv using AE PRN   Goal Outcome # 1   (NT this session)   Short Term Goal # 2 Pt will complete ADL txfs with SBA   Goal Outcome # 2 Progressing as expected   Short Term Goal # 3 Pt will complete toileting with hygiene with SBA   Goal Outcome # 3 Goal not met   Education Group   Weight Bearing Precautions Patient Response Patient;Acceptance;Explanation;Demonstration;Verbal Demonstration;Action Demonstration   Pathology of Bedrest Patient Response Patient;Acceptance;Explanation;Reinforcement Needed;Verbal Demonstration

## 2024-01-17 NOTE — DISCHARGE PLANNING
3442: Contacted friend Nidia [phone] to verify dc support and left vm. TCC will continue to follow.    1103: Spoke with friend Nidia who reports she plans to stay with patient upon dc. Nidia reports she is willing to stay with patient as long as needed, but patient is eager to regain her independence. Patient lives alone in a mobile home with 2 MOMO to the porch. Nidia reports they are working on having a ramp installed. Nidia states she has experience caregiving and is comfortable assisting with bathing and toielting. Nidia currently works as a manager at [employer], but reports he schedule is flexible. Patient also has support from friends Jailene and Kelsea who will assist if Nidia is not available. Discussed expectations for IPR, patient/family preference is a short-term rehab.     RR is OON with [employer] Medicare and unfortunately patient's plan offers no OON benefits. Recommend referring to [facility], notified cm. TCC will no longer follow.

## 2024-01-17 NOTE — PROGRESS NOTES
"      Orthopaedic Progress Note    Interval changes:  Patient doing well   LLE dressings are CDI  Cleared for DC to rehab by ortho pending medicine clearance    ROS - Patient denies any new issues.  Pain well controlled.    BP (!) 141/93   Pulse 86   Temp 36.4 °C (97.5 °F) (Temporal)   Resp 16   Ht 1.6 m (5' 3\")   Wt 75.8 kg (167 lb)   SpO2 91%     Patient seen and examined  No acute distress  Breathing non labored  RRR  LLE dressings CDI, DNVI, moves all toes, cap refill <2 sec.     Recent Labs     01/15/24  0807 01/16/24  0021 01/17/24  0739   WBC 10.8 10.7 6.1   RBC 2.97* 2.78* 2.94*   HEMOGLOBIN 9.4* 8.9* 9.0*   HEMATOCRIT 27.9* 25.9* 26.9*   MCV 93.9 93.2 91.5   MCH 31.6 32.0 30.6   MCHC 33.7 34.4 33.5   RDW 41.4 41.1 39.8   PLATELETCT 194 211 265   MPV 9.9 9.7 9.7       Active Hospital Problems    Diagnosis     Severely comminuted fracture of the left distal femur [T14.8XXA]     Tobacco dependence [F17.200]     Hypertensive urgency [I16.0]     BMI 29.0-29.9,adult [Z68.29]     Anxiety [F41.9]     Hyponatremia [E87.1]     Dehydration [E86.0]     Gastroesophageal reflux disease [K21.9]        Assessment/Plan:  Patient doing well   LLE dressings are CDI  Cleared for DC to rehab by ortho pending medicine clearance  POD#5 S/P Open treatment with internal fixation of right supracondylar femur fracture without intercondylar extension.  Wt bearing status - TTWB LLE  Wound care/Drains - Dressings to be changed every other day by nursing. Or PRN for saturation starting POD#2  Future Procedures - none planned   Lovenox: Start 1/14, Duration-until ambulatory > 150'  Sutures/Staples out- 14-21 days post operatively. Removal will completed by ortho mid levels only.  PT/OT-initiated  Antibiotics: Perioperative completed  DVT Prophylaxis- TEDS/SCDs/Foot pumps  Flanagan-not needed per ortho  Case Coordination for Discharge Planning - Disposition per therapy recs.    "

## 2024-01-17 NOTE — DISCHARGE PLANNING
[facility] following. Requested RN UMBERTO for patient's discharge support, Nidia, phone number. RN UMBERTO obtained Nidia's number from patient.   Nidia: [phone].   Phone number provided to Rehab T[facility]  1117: Unable to obtained insurance authorization for [facility]. RN UMBERTO sent referral to [facility] rehab.     Case Management Discharge Planning    Admission Date: 1/12/2024  GMLOS: 4.5  ALOS: 5    6-Clicks ADL Score: 17  6-Clicks Mobility Score: 9  PT and/or OT Eval ordered: Yes  Post-acute Referrals Ordered: Yes  Post-acute Choice Obtained: Yes  Has referral(s) been sent to post-acute provider:  Yes      Anticipated Discharge Dispo: Discharge Disposition: D/T to home under A care in anticipation of covered skilled care (06)  Discharge Address: [address] [place] [address] [place] [address]    DME Needed: No    Action(s) Taken: Updated Provider/Nurse on Discharge Plan and Patient Conference    Escalations Completed: Pending Discharge Destination    Medically Clear: Yes    [facility]    Barriers to Discharge: Pending Placement

## 2024-01-18 ENCOUNTER — APPOINTMENT (OUTPATIENT)
Dept: RADIOLOGY | Facility: MEDICAL CENTER | Age: 69
DRG: 481 | End: 2024-01-18
Attending: HOSPITALIST
Payer: COMMERCIAL

## 2024-01-18 PROBLEM — I10 HYPERTENSION: Status: ACTIVE | Noted: 2024-01-18

## 2024-01-18 PROBLEM — E87.6 HYPOKALEMIA: Status: ACTIVE | Noted: 2024-01-18

## 2024-01-18 LAB
ALBUMIN SERPL BCP-MCNC: 3 G/DL (ref 3.2–4.9)
BUN SERPL-MCNC: 7 MG/DL (ref 8–22)
CALCIUM ALBUM COR SERPL-MCNC: 9.4 MG/DL (ref 8.5–10.5)
CALCIUM SERPL-MCNC: 8.6 MG/DL (ref 8.5–10.5)
CHLORIDE SERPL-SCNC: 102 MMOL/L (ref 96–112)
CO2 SERPL-SCNC: 27 MMOL/L (ref 20–33)
CREAT SERPL-MCNC: 0.3 MG/DL (ref 0.5–1.4)
ERYTHROCYTE [DISTWIDTH] IN BLOOD BY AUTOMATED COUNT: 40 FL (ref 35.9–50)
GFR SERPLBLD CREATININE-BSD FMLA CKD-EPI: 114 ML/MIN/1.73 M 2
GLUCOSE SERPL-MCNC: 104 MG/DL (ref 65–99)
HCT VFR BLD AUTO: 29.7 % (ref 37–47)
HGB BLD-MCNC: 9.9 G/DL (ref 12–16)
MCH RBC QN AUTO: 31 PG (ref 27–33)
MCHC RBC AUTO-ENTMCNC: 33.3 G/DL (ref 32.2–35.5)
MCV RBC AUTO: 93.1 FL (ref 81.4–97.8)
PHOSPHATE SERPL-MCNC: 3.5 MG/DL (ref 2.5–4.5)
PLATELET # BLD AUTO: 322 K/UL (ref 164–446)
PMV BLD AUTO: 9.2 FL (ref 9–12.9)
POTASSIUM SERPL-SCNC: 3.1 MMOL/L (ref 3.6–5.5)
RBC # BLD AUTO: 3.19 M/UL (ref 4.2–5.4)
SODIUM SERPL-SCNC: 138 MMOL/L (ref 135–145)
WBC # BLD AUTO: 7.7 K/UL (ref 4.8–10.8)

## 2024-01-18 PROCEDURE — 97116 GAIT TRAINING THERAPY: CPT | Mod: CQ

## 2024-01-18 PROCEDURE — 80069 RENAL FUNCTION PANEL: CPT

## 2024-01-18 PROCEDURE — A9270 NON-COVERED ITEM OR SERVICE: HCPCS | Performed by: HOSPITALIST

## 2024-01-18 PROCEDURE — 700102 HCHG RX REV CODE 250 W/ 637 OVERRIDE(OP): Performed by: HOSPITALIST

## 2024-01-18 PROCEDURE — 700111 HCHG RX REV CODE 636 W/ 250 OVERRIDE (IP): Mod: JZ | Performed by: HOSPITALIST

## 2024-01-18 PROCEDURE — 770001 HCHG ROOM/CARE - MED/SURG/GYN PRIV*

## 2024-01-18 PROCEDURE — 71045 X-RAY EXAM CHEST 1 VIEW: CPT

## 2024-01-18 PROCEDURE — 99233 SBSQ HOSP IP/OBS HIGH 50: CPT | Performed by: HOSPITALIST

## 2024-01-18 PROCEDURE — 85027 COMPLETE CBC AUTOMATED: CPT

## 2024-01-18 PROCEDURE — 36415 COLL VENOUS BLD VENIPUNCTURE: CPT

## 2024-01-18 PROCEDURE — 700111 HCHG RX REV CODE 636 W/ 250 OVERRIDE (IP): Mod: JZ | Performed by: INTERNAL MEDICINE

## 2024-01-18 PROCEDURE — 97530 THERAPEUTIC ACTIVITIES: CPT | Mod: CQ

## 2024-01-18 RX ORDER — POTASSIUM CHLORIDE 20 MEQ/1
40 TABLET, EXTENDED RELEASE ORAL ONCE
Status: COMPLETED | OUTPATIENT
Start: 2024-01-18 | End: 2024-01-18

## 2024-01-18 RX ORDER — HYDROCODONE BITARTRATE AND ACETAMINOPHEN 5; 325 MG/1; MG/1
1 TABLET ORAL EVERY 8 HOURS PRN
Qty: 15 TABLET | Refills: 0 | Status: SHIPPED
Start: 2024-01-18 | End: 2024-01-19

## 2024-01-18 RX ORDER — LISINOPRIL 20 MG/1
20 TABLET ORAL DAILY
Qty: 30 TABLET | Status: SHIPPED
Start: 2024-01-18 | End: 2024-01-19

## 2024-01-18 RX ORDER — NICOTINE 21 MG/24HR
1 PATCH, TRANSDERMAL 24 HOURS TRANSDERMAL EVERY 24 HOURS
Qty: 30 PATCH | Status: SHIPPED
Start: 2024-01-18 | End: 2024-01-19

## 2024-01-18 RX ORDER — LISINOPRIL 20 MG/1
20 TABLET ORAL
Status: DISCONTINUED | OUTPATIENT
Start: 2024-01-18 | End: 2024-01-19 | Stop reason: HOSPADM

## 2024-01-18 RX ORDER — OXYCODONE HYDROCHLORIDE 5 MG/1
5 TABLET ORAL EVERY 6 HOURS PRN
Qty: 12 TABLET | Refills: 0 | Status: SHIPPED
Start: 2024-01-18 | End: 2024-01-18

## 2024-01-18 RX ORDER — ENOXAPARIN SODIUM 100 MG/ML
40 INJECTION SUBCUTANEOUS DAILY
Status: ACTIVE | DISCHARGE
Start: 2024-01-18 | End: 2024-01-19

## 2024-01-18 RX ORDER — ACETAMINOPHEN 325 MG/1
650 TABLET ORAL EVERY 6 HOURS PRN
Qty: 30 TABLET | Refills: 0 | Status: SHIPPED
Start: 2024-01-18 | End: 2024-01-19

## 2024-01-18 RX ADMIN — ONDANSETRON 4 MG: 2 INJECTION INTRAMUSCULAR; INTRAVENOUS at 08:16

## 2024-01-18 RX ADMIN — OXYCODONE 5 MG: 5 TABLET ORAL at 00:21

## 2024-01-18 RX ADMIN — OXYCODONE 5 MG: 5 TABLET ORAL at 08:16

## 2024-01-18 RX ADMIN — ALPRAZOLAM 0.5 MG: 0.5 TABLET ORAL at 21:44

## 2024-01-18 RX ADMIN — OXYCODONE 5 MG: 5 TABLET ORAL at 18:26

## 2024-01-18 RX ADMIN — LISINOPRIL 20 MG: 20 TABLET ORAL at 11:23

## 2024-01-18 RX ADMIN — FLUOXETINE HYDROCHLORIDE 40 MG: 20 CAPSULE ORAL at 04:25

## 2024-01-18 RX ADMIN — FLUOXETINE HYDROCHLORIDE 40 MG: 20 CAPSULE ORAL at 16:02

## 2024-01-18 RX ADMIN — OMEPRAZOLE 40 MG: 20 CAPSULE, DELAYED RELEASE ORAL at 04:24

## 2024-01-18 RX ADMIN — POTASSIUM CHLORIDE 40 MEQ: 1500 TABLET, EXTENDED RELEASE ORAL at 08:20

## 2024-01-18 RX ADMIN — DOCUSATE SODIUM 50 MG AND SENNOSIDES 8.6 MG 2 TABLET: 8.6; 5 TABLET, FILM COATED ORAL at 04:25

## 2024-01-18 RX ADMIN — NICOTINE 14 MG: 14 PATCH TRANSDERMAL at 04:25

## 2024-01-18 RX ADMIN — CYCLOBENZAPRINE 5 MG: 10 TABLET, FILM COATED ORAL at 16:02

## 2024-01-18 RX ADMIN — OMEPRAZOLE 40 MG: 20 CAPSULE, DELAYED RELEASE ORAL at 16:02

## 2024-01-18 RX ADMIN — OXYCODONE 5 MG: 5 TABLET ORAL at 21:45

## 2024-01-18 RX ADMIN — OXYCODONE 5 MG: 5 TABLET ORAL at 13:41

## 2024-01-18 RX ADMIN — OXYCODONE 5 MG: 5 TABLET ORAL at 04:24

## 2024-01-18 RX ADMIN — DOCUSATE SODIUM 50 MG AND SENNOSIDES 8.6 MG 2 TABLET: 8.6; 5 TABLET, FILM COATED ORAL at 16:02

## 2024-01-18 RX ADMIN — ENOXAPARIN SODIUM 40 MG: 100 INJECTION SUBCUTANEOUS at 16:02

## 2024-01-18 ASSESSMENT — GAIT ASSESSMENTS
DISTANCE (FEET): 15
ASSISTIVE DEVICE: FRONT WHEEL WALKER
GAIT LEVEL OF ASSIST: CONTACT GUARD ASSIST

## 2024-01-18 ASSESSMENT — PAIN DESCRIPTION - PAIN TYPE
TYPE: ACUTE PAIN
TYPE: ACUTE PAIN;SURGICAL PAIN
TYPE: ACUTE PAIN
TYPE: ACUTE PAIN;SURGICAL PAIN

## 2024-01-18 ASSESSMENT — ENCOUNTER SYMPTOMS
CONSTIPATION: 0
SHORTNESS OF BREATH: 0
VOMITING: 0
DIZZINESS: 0
NAUSEA: 0
HEADACHES: 0
CHILLS: 0
FALLS: 1
COUGH: 0
FEVER: 0
NERVOUS/ANXIOUS: 0
BLOOD IN STOOL: 0
MYALGIAS: 1
INSOMNIA: 0

## 2024-01-18 ASSESSMENT — COGNITIVE AND FUNCTIONAL STATUS - GENERAL
MOVING FROM LYING ON BACK TO SITTING ON SIDE OF FLAT BED: A LOT
MOVING TO AND FROM BED TO CHAIR: A LOT
SUGGESTED CMS G CODE MODIFIER MOBILITY: CL
CLIMB 3 TO 5 STEPS WITH RAILING: TOTAL
MOBILITY SCORE: 14
WALKING IN HOSPITAL ROOM: A LITTLE
STANDING UP FROM CHAIR USING ARMS: A LITTLE
TURNING FROM BACK TO SIDE WHILE IN FLAT BAD: A LITTLE

## 2024-01-18 NOTE — DISCHARGE PLANNING
0994  Spoke to Paty at  Rehab currently no open beds. Per Paty no open beds till possibly Monday.      @1015  Received VM from Aneta @  Rehab. Patient declined, new facility MD is not credentialed with [employer] yet.

## 2024-01-18 NOTE — CARE PLAN
The patient is Stable - Low risk of patient condition declining or worsening    Shift Goals  Clinical Goals: pain control, mobility  Patient Goals: pain control, rest  Family Goals: NA    Progress made toward(s) clinical / shift goals:  yes  Problem: Pain - Standard  Goal: Alleviation of pain or a reduction in pain to the patient’s comfort goal  Outcome: Progressing     Problem: Skin Integrity  Goal: Skin integrity is maintained or improved  Outcome: Progressing     Problem: Fall Risk  Goal: Patient will remain free from falls  Outcome: Progressing       Patient is not progressing towards the following goals:

## 2024-01-18 NOTE — CARE PLAN
The patient is Stable - Low risk of patient condition declining or worsening    Shift Goals  Clinical Goals: pain control, mobility  Patient Goals: pain control  Family Goals: NA    Progress made toward(s) clinical / shift goals:    Problem: Pain - Standard  Goal: Alleviation of pain or a reduction in pain to the patient’s comfort goal  Outcome: Progressing  Note: Pt states pain is relieved with current pain medication regimen. Pt medicated per MAR with + results. Pt provided ice packs and pillows for comfort.        Problem: Fall Risk  Goal: Patient will remain free from falls  Outcome: Progressing  Note: Bed alarm on, call light and side table in reach, treaded socks on, no DME at bedside, hourly rounding in place.

## 2024-01-18 NOTE — DOCUMENTATION QUERY
[facility]                                                                       Query Response Note      PATIENT:               LETY HERNANDEZ  ACCT #:                  [id number]  MRN:                     [id number]  :                      1955  ADMIT DATE:       2024 9:00 AM  DISCH DATE:          RESPONDING  PROVIDER #:        [id number]           QUERY TEXT:    Based on the clinical findings BMI 29.58, can you please provide a diagnosis to support this finding?    The patient's Clinical Indicators include:  The clinical documentation has noted a BMI of 29.58  Recommended treatment weight loss; 5% through reduced calorie, low carb diet and 150 minutes of exercise a week when able post operatively.  In hospital use of spirometry   Risk:  currently post op ORIF of the left lower leg, increased risk of post operative hypoxia.     Thank you,  JENNIFER Santos RN   Clinical Documentation Integrity   [facility]   [email or web address]  connect via Voalte  [phone]  Options provided:   -- Obese   -- Morbidly obese   -- Overweight   -- Other explanation, (please specify other explanation)      Query created by: Roberta De Jesus on 2024 8:07 AM    RESPONSE TEXT:    Obese          Electronically signed by:  MARLEEN MATA MD 2024 4:21 PM

## 2024-01-18 NOTE — THERAPY
"Physical Therapy   Daily Treatment     Patient Name: Windy León  Age:  68 y.o., Sex:  female  Medical Record #: [id number]  Today's Date: 1/18/2024     Precautions  Precautions: Fall Risk;Toe Touch Weight Bearing Left Lower Extremity    Assessment    Pt greeted and seen for PT treatment. Pt performed bed mobility w/o assist. She stood and step hop w/ FWW and CGA 15ft to transfer to chair. She reported that friends are building her a ramp and that she has great assist from friends for a DC home, she has a FWW at home.   Pt currently limited by impaired balance 2/2 WB status, decreased strength and decreased activity tolerance which negatively impacts functional mobility. Pt will continue to benefit from skilled PT to address deficits.       Plan    Treatment Plan Status: Continue Current Treatment Plan  Type of Treatment: Bed Mobility, Gait Training, Equipment, Neuro Re-Education / Balance, Self Care / Home Evaluation, Stair Training, Therapeutic Activities, Therapeutic Exercise  Treatment Frequency: 5 Times per Week  Treatment Duration: Until Therapy Goals Met    DC Equipment Recommendations: None (pt has a FWW)  Discharge Recommendations: Recommend post-acute placement for additional physical therapy services prior to discharge home (If pt were to DC home then recommend Home health if available)      Subjective    \"I crunched the crackers once\"        01/18/24 1405   Supine Lower Body Exercise   Comments Pt reported performing heel slides and improved tolerance to knee bending   Balance   Sitting Balance (Static) Fair +   Sitting Balance (Dynamic) Fair +   Standing Balance (Static) Fair   Standing Balance (Dynamic) Fair -   Weight Shift Sitting Fair   Weight Shift Standing Absent   Skilled Intervention Verbal Cuing   Comments w/ FWW   Bed Mobility    Supine to Sit Contact Guard Assist   Scooting Contact Guard Assist   Skilled Intervention Verbal Cuing   Comments Pt requested support of L LE during bed mobility only " needed CGA   Gait Analysis   Gait Level Of Assist Contact Guard Assist   Assistive Device Front Wheel Walker   Distance (Feet) 15   # of Times Distance was Traveled 1   Deviation   (step hop)   Weight Bearing Status TTWB L LE   Skilled Intervention Verbal Cuing;Compensatory Strategies   Comments Pt reported that friend is building ramp to enter home and that she will have great social support and assist at home. She was able to ambulated around bed, she reporting putting weight through L LE once.   Functional Mobility   Sit to Stand Contact Guard Assist   Bed, Chair, Wheelchair Transfer Contact Guard Assist   Transfer Method   (step hop)   Mobility bed>chair   Skilled Intervention Verbal Cuing;Compensatory Strategies   Comments Pt left with L LE elevated on additional chair, she declined recliner   Short Term Goals    Short Term Goal # 1 pt will perform supine <> sit with SPV in 6 visits   Goal Outcome # 1 Progressing as expected   Short Term Goal # 2 pt will perform sit < > stand with FWW and CGA in 6 visits   Goal Outcome # 2 Goal met   Short Term Goal # 3 pt will complete stand-pivot transfer bed > chair (or wheelchair) with Gina in 6 visits   Goal Outcome # 3 Goal met   Short Term Goal # 4 Pt will tolerate sitting in chair for 1 hour in 6 visits   Goal Outcome # 4 Goal met   Supervising Physical Therapist (PTA Treatments Only)   Supervising Physical Therapist Alina Jiang

## 2024-01-18 NOTE — PROGRESS NOTES
Patient A&OX4. Patient stated pain. Meds administered per MAR as well as pain med.  Pure wic in place.  Water and call light within reach.  Redress soft roll and ace wrap because those were soiled.

## 2024-01-18 NOTE — DISCHARGE PLANNING
MONIQUE attempted to meet with pt at bedside to discuss SNF choice, as both local acute rehabs have declined due to insurance. Pt busy with staff. MONIQUE will try again shortly.    Update: MONIQUE met with pt at 1210. Pt refusing SNF and prefers discharge to home with home health. MONIQUE updated MD and is awaiting  orders. MONIQUE faxed choice for [facility] [employer] to [facility] Pt verbally consented to sign choice form due to not having choice form available during conversation with pt.

## 2024-01-18 NOTE — THERAPY
Physical Therapy   Daily Treatment     Patient Name: Windy León  Age:  68 y.o., Sex:  female  Medical Record #: [id number]  Today's Date: 1/17/2024     Precautions  Precautions: Toe Touch Weight Bearing Left Lower Extremity;Fall Risk    Assessment    Pt greeted and seen for PT treatment. Pt needs Gina for bed mobility to manage L LE. She stood and was able to hop a couple steps to a chair w/ FWW and CGA. Fair adherence to TTWB.   Pt currently limited by impaired balance 2/2 WB status, decreased strength and decreased activity tolerance which negatively impacts functional mobility. Pt will continue to benefit from skilled PT to address deficits.       Plan    Treatment Plan Status: Continue Current Treatment Plan  Type of Treatment: Bed Mobility, Gait Training, Equipment, Neuro Re-Education / Balance, Self Care / Home Evaluation, Stair Training, Therapeutic Activities, Therapeutic Exercise  Treatment Frequency: 5 Times per Week  Treatment Duration: Until Therapy Goals Met    DC Equipment Recommendations: Unable to determine at this time  Discharge Recommendations: Recommend post-acute placement for additional physical therapy services prior to discharge home       01/17/24 1525   Cognition    Comments pleasant and cooperative, pain limited but participatory with encouragement   Supine Lower Body Exercise   Comments pt is able to perform DF. Assisted pt in performing heel slides x5.   Balance   Sitting Balance (Static) Fair +   Sitting Balance (Dynamic) Fair +   Standing Balance (Static) Fair -   Standing Balance (Dynamic) Poor +   Weight Shift Sitting Fair   Weight Shift Standing Absent   Skilled Intervention Verbal Cuing   Comments w/ FWW   Bed Mobility    Supine to Sit Minimal Assist   Sit to Supine Minimal Assist   Scooting Contact Guard Assist   Skilled Intervention Verbal Cuing   Comments HOB flat   Gait Analysis   Gait Level Of Assist Contact Guard Assist   Assistive Device Front Wheel Walker   Distance (Feet) 2    # of Times Distance was Traveled 2   Deviation   (step hop)   Weight Bearing Status TTWB L LE   Skilled Intervention Verbal Cuing   Comments pt was able to hop with increased reliance on FWW.   Functional Mobility   Sit to Stand Contact Guard Assist   Bed, Chair, Wheelchair Transfer Contact Guard Assist   Transfer Method   (step hop)   Mobility bed<>chair   Skilled Intervention Verbal Cuing   Comments w/ FWW   Short Term Goals    Short Term Goal # 1 pt will perform supine <> sit with SPV in 6 visits   Goal Outcome # 1 Progressing as expected   Short Term Goal # 2 pt will perform sit < > stand with FWW and CGA in 6 visits   Goal Outcome # 2 Goal met   Short Term Goal # 3 pt will complete stand-pivot transfer bed > chair (or wheelchair) with Gina in 6 visits   Goal Outcome # 3 Goal met   Short Term Goal # 4 Pt will tolerate sitting in chair for 1 hour in 6 visits   Goal Outcome # 4 Goal not met   Supervising Physical Therapist (PTA Treatments Only)   Supervising Physical Therapist Astrid López

## 2024-01-18 NOTE — PROGRESS NOTES
Hospital Medicine Daily Progress Note    Date of Service  1/17/2024    Chief Complaint  Windy León is a 68 y.o. female admitted 1/12/2024 with GLF (MGLF on ice today, denies hitting head / LOC) and Leg Injury (LLE swelling / pain in L thigh s/p GLF today, hx osteoporosis and L hip replacement, +CMS distal to injury)    Hospital Course  68 year old female presented with fall, injuring left leg. Imaging showed left displaced, comminuted supracondylar femur fracture s/p ORIF by Dr. Amezquita, Ortho 1/12. Leukocytosis trending down 15-12.    She was having pain and muscle spasms. Ordred Valium. Pain is now better controlled. Ortho recs: TTWB PT/OT mobilization ASAP.  Mild hyponatremia, monitor.     PT/OT pending. Pain improved PT/OT recommends skilled but patient prefers home (she lives on a mobile home) or rehab    Interval Problem Update  1/16: Tearful initially did not want to go to rehab but discussed with physiatrist and patient finally agreed.  Replating potassium.  1/17: In the morning, patient again changed her mind and wanted to go home.  After further discussion, she was agreeable to rehab.  Total time 53 minutes.    I have discussed this patient's plan of care and discharge plan at IDT rounds today with Case Management, Nursing, Nursing leadership, and other members of the IDT team.    Disposition  The patient is not medically cleared for discharge to home or a post-acute facility.    I have placed the appropriate orders for post-discharge needs.    Review of Systems  Review of Systems   Constitutional:  Negative for chills and fever.   Respiratory:  Negative for cough, hemoptysis and shortness of breath.    Cardiovascular:  Negative for chest pain.   Gastrointestinal:  Negative for abdominal pain, blood in stool, constipation, diarrhea, nausea and vomiting.   Genitourinary:  Negative for dysuria.   Musculoskeletal:  Positive for falls, joint pain and myalgias.   Skin:  Negative for rash.   Neurological:   Negative for dizziness and headaches.   Psychiatric/Behavioral:  The patient is not nervous/anxious and does not have insomnia.         Physical Exam  Temp:  [36.1 °C (97 °F)-36.8 °C (98.2 °F)] 36.8 °C (98.2 °F)  Pulse:  [85-88] 85  Resp:  [16] 16  BP: (136-144)/(72-93) 136/72  SpO2:  [91 %-98 %] 95 %    Physical Exam  Vitals and nursing note reviewed.   Constitutional:       General: She is not in acute distress.  HENT:      Head: Normocephalic and atraumatic.      Right Ear: External ear normal.      Left Ear: External ear normal.      Nose: Nose normal.      Mouth/Throat:      Mouth: Mucous membranes are moist.   Eyes:      General: No scleral icterus.     Conjunctiva/sclera: Conjunctivae normal.   Cardiovascular:      Rate and Rhythm: Normal rate and regular rhythm.      Pulses: Normal pulses.      Heart sounds:      No gallop.   Pulmonary:      Effort: Pulmonary effort is normal. No respiratory distress.      Breath sounds: No wheezing or rales.   Abdominal:      General: Abdomen is flat. Bowel sounds are normal. There is no distension.      Palpations: Abdomen is soft.      Tenderness: There is no abdominal tenderness. There is no guarding.   Musculoskeletal:         General: Tenderness (L leg soreness) present. No swelling or deformity.      Cervical back: No rigidity.   Skin:     General: Skin is warm.      Coloration: Skin is not jaundiced.   Neurological:      General: No focal deficit present.      Mental Status: She is alert and oriented to person, place, and time.   Psychiatric:         Mood and Affect: Mood normal.         Thought Content: Thought content normal.         Fluids    Intake/Output Summary (Last 24 hours) at 1/17/2024 1623  Last data filed at 1/17/2024 1500  Gross per 24 hour   Intake 996 ml   Output 2800 ml   Net -1804 ml         Laboratory  Recent Labs     01/15/24  0807 01/16/24  0021 01/17/24  0739   WBC 10.8 10.7 6.1   RBC 2.97* 2.78* 2.94*   HEMOGLOBIN 9.4* 8.9* 9.0*   HEMATOCRIT  27.9* 25.9* 26.9*   MCV 93.9 93.2 91.5   MCH 31.6 32.0 30.6   MCHC 33.7 34.4 33.5   RDW 41.4 41.1 39.8   PLATELETCT 194 211 265   MPV 9.9 9.7 9.7       Recent Labs     01/15/24  0807 01/16/24  0021 01/17/24  0739   SODIUM 135 135 140   POTASSIUM 3.3* 3.3* 3.5*   CHLORIDE 101 99 104   CO2 27 24 26   GLUCOSE 116* 123* 109*   BUN 6* 8 6*   CREATININE 0.30* 0.41* 0.30*   CALCIUM 8.2* 8.1* 8.5                     Imaging  DX-PORTABLE FLUORO > 1 HOUR   Final Result      Portable fluoroscopy utilized for 1 minute 37 seconds.         INTERPRETING LOCATION: [address] [place] [address], [id number]      DX-KNEE 2- LEFT   Final Result         1.  Intraoperative changes of distal femoral ORIF in progress      CT-KNEE W/O PLUS RECONS LEFT   Final Result      Comminuted distal femur fracture. No definite extension to the articular surface.      DX-KNEE 3 VIEWS LEFT   Final Result      1.  Comminuted and impacted fracture of the distal left femur without evidence of articular extension. Persistent posterior angulation post reduction.   2.  Osteopenia.      DX-KNEE 3 VIEWS LEFT   Final Result      1.  Partially visualized, comminuted fracture of the distal left femur without evidence of articular extension.   2.  Osteopenia.      DX-FEMUR-2+ LEFT   Final Result      1.  Severely comminuted fracture of the distal femur. Recommend knee radiographs to more confidently exclude extension to the knee joint.   2.  Prior LEFT hip arthroplasty   3.  Osteopenia   4.  Osteoarthritis      DX-PELVIS-1 OR 2 VIEWS   Final Result      1.  Deformity of the RIGHT pubic bone compatible with age indeterminate trauma. This could be acute or chronic fracture. Comparison with any prior images would be helpful.   2.  Prior LEFT hip arthroplasty   3.  Osteopenia   4.  Osteoarthritis           Assessment/Plan  * Severely comminuted fracture of the left distal femur- (present on admission)  Assessment & Plan  Orthopedics consulted,  S/p ORIF 1/12.   Ordered  "antispasmodic. Monitor mentation/sedation score, respirations blood ressure if on narcotics.  Patient now agreeable to go to rehab versus SNF although she continues to have doubts.    Dehydration- (present on admission)  Assessment & Plan  Likely secondary to reduced oral intake.   Encourage oral intake as tolerated, antiemetics as needed.    Hyponatremia- (present on admission)  Assessment & Plan  Resolved with IVF    Anxiety- (present on admission)  Assessment & Plan  Resume fluoxetine   As needed alprazolam    Overweight (BMI 25.0-29.9)- (present on admission)  Assessment & Plan  The patient has an elevated BMI of 29.  She is at higher risk for postoperative hypoxia. I will order continuous pulse oximetry  Emphasized incentive spirometry, consider BiPAP/CPAP postprocedure  \"    Recommended weight loss advised, 5% through reduced calorie, low carb diet and 150 mins of exercise a week once better  Recommend bariatric surgery evaluation if morbidly obese  Educated on the increase of morbidity and mortality associated with excess weight including DM, Heart Disease, HTN, stroke, and sleep apnea. Recommended outpatient monitoring  of blood sugars, lipid panel, sleep study evaluation for metabolic syndrome.        Hypertensive urgency- (present on admission)  Assessment & Plan  Now improved    Tobacco dependence- (present on admission)  Assessment & Plan  Counseled on admission         VTE prophylaxis: Ordered Lovenox ppx    I have performed a physical exam and reviewed and updated ROS and Plan today (1/17/2024). In review of yesterday's note (1/16/2024), there are no changes except as documented above.        "

## 2024-01-18 NOTE — CARE PLAN
The patient is Stable - Low risk of patient condition declining or worsening    Shift Goals  Clinical Goals: pain control, mobility  Patient Goals: pain control  Family Goals: no family present    Progress made toward(s) clinical / shift goals:    Problem: Pain - Standard  Goal: Alleviation of pain or a reduction in pain to the patient’s comfort goal  Outcome: Progressing  Note: Pt states pain is relieved with current pain medication regimen. Pt medicated per MAR with + results. Pt provided ice packs and pillows for comfort.         Problem: Fall Risk  Goal: Patient will remain free from falls  Outcome: Progressing  Note: Bed alarm on, call light and side table in reach, treaded socks on, no DME at bedside, hourly rounding in place.

## 2024-01-18 NOTE — DISCHARGE PLANNING
Received Choice form at [phone]  Agency/Facility Name: [facility]  Referral sent per Choice form @ [phone]

## 2024-01-19 ENCOUNTER — PHARMACY VISIT (OUTPATIENT)
Dept: PHARMACY | Facility: MEDICAL CENTER | Age: 69
End: 2024-01-19
Payer: COMMERCIAL

## 2024-01-19 VITALS
DIASTOLIC BLOOD PRESSURE: 89 MMHG | TEMPERATURE: 98.1 F | SYSTOLIC BLOOD PRESSURE: 133 MMHG | BODY MASS INDEX: 29.59 KG/M2 | RESPIRATION RATE: 17 BRPM | WEIGHT: 167 LBS | HEIGHT: 63 IN | OXYGEN SATURATION: 95 % | HEART RATE: 107 BPM

## 2024-01-19 LAB
ALBUMIN SERPL BCP-MCNC: 2.9 G/DL (ref 3.2–4.9)
BUN SERPL-MCNC: 6 MG/DL (ref 8–22)
CALCIUM ALBUM COR SERPL-MCNC: 9.2 MG/DL (ref 8.5–10.5)
CALCIUM SERPL-MCNC: 8.3 MG/DL (ref 8.5–10.5)
CHLORIDE SERPL-SCNC: 103 MMOL/L (ref 96–112)
CO2 SERPL-SCNC: 24 MMOL/L (ref 20–33)
CREAT SERPL-MCNC: 0.31 MG/DL (ref 0.5–1.4)
ERYTHROCYTE [DISTWIDTH] IN BLOOD BY AUTOMATED COUNT: 41.6 FL (ref 35.9–50)
GFR SERPLBLD CREATININE-BSD FMLA CKD-EPI: 114 ML/MIN/1.73 M 2
GLUCOSE SERPL-MCNC: 103 MG/DL (ref 65–99)
HCT VFR BLD AUTO: 27.9 % (ref 37–47)
HGB BLD-MCNC: 9.2 G/DL (ref 12–16)
MCH RBC QN AUTO: 31.1 PG (ref 27–33)
MCHC RBC AUTO-ENTMCNC: 33 G/DL (ref 32.2–35.5)
MCV RBC AUTO: 94.3 FL (ref 81.4–97.8)
NT-PROBNP SERPL IA-MCNC: 260 PG/ML (ref 0–125)
PHOSPHATE SERPL-MCNC: 4 MG/DL (ref 2.5–4.5)
PLATELET # BLD AUTO: 357 K/UL (ref 164–446)
PMV BLD AUTO: 9.3 FL (ref 9–12.9)
POTASSIUM SERPL-SCNC: 3.2 MMOL/L (ref 3.6–5.5)
PROCALCITONIN SERPL-MCNC: <0.05 NG/ML
RBC # BLD AUTO: 2.96 M/UL (ref 4.2–5.4)
SODIUM SERPL-SCNC: 139 MMOL/L (ref 135–145)
WBC # BLD AUTO: 7.2 K/UL (ref 4.8–10.8)

## 2024-01-19 PROCEDURE — A9270 NON-COVERED ITEM OR SERVICE: HCPCS | Performed by: HOSPITALIST

## 2024-01-19 PROCEDURE — 700102 HCHG RX REV CODE 250 W/ 637 OVERRIDE(OP): Performed by: HOSPITALIST

## 2024-01-19 PROCEDURE — 80069 RENAL FUNCTION PANEL: CPT

## 2024-01-19 PROCEDURE — 97535 SELF CARE MNGMENT TRAINING: CPT

## 2024-01-19 PROCEDURE — 84145 PROCALCITONIN (PCT): CPT

## 2024-01-19 PROCEDURE — 99239 HOSP IP/OBS DSCHRG MGMT >30: CPT | Performed by: HOSPITALIST

## 2024-01-19 PROCEDURE — 85027 COMPLETE CBC AUTOMATED: CPT

## 2024-01-19 PROCEDURE — 700111 HCHG RX REV CODE 636 W/ 250 OVERRIDE (IP): Mod: JZ | Performed by: HOSPITALIST

## 2024-01-19 PROCEDURE — 83880 ASSAY OF NATRIURETIC PEPTIDE: CPT

## 2024-01-19 PROCEDURE — 97530 THERAPEUTIC ACTIVITIES: CPT

## 2024-01-19 PROCEDURE — RXMED WILLOW AMBULATORY MEDICATION CHARGE: Performed by: HOSPITALIST

## 2024-01-19 RX ORDER — FUROSEMIDE 10 MG/ML
20 INJECTION INTRAMUSCULAR; INTRAVENOUS
Status: DISCONTINUED | OUTPATIENT
Start: 2024-01-19 | End: 2024-01-19 | Stop reason: HOSPADM

## 2024-01-19 RX ORDER — HYDROCODONE BITARTRATE AND ACETAMINOPHEN 5; 325 MG/1; MG/1
1 TABLET ORAL EVERY 8 HOURS PRN
Qty: 15 TABLET | Refills: 0 | Status: SHIPPED | OUTPATIENT
Start: 2024-01-19 | End: 2024-01-24

## 2024-01-19 RX ORDER — POTASSIUM CHLORIDE 20 MEQ/1
60 TABLET, EXTENDED RELEASE ORAL ONCE
Status: COMPLETED | OUTPATIENT
Start: 2024-01-19 | End: 2024-01-19

## 2024-01-19 RX ORDER — ASPIRIN 81 MG/1
81 TABLET ORAL 2 TIMES DAILY
Qty: 20 TABLET | Refills: 0 | Status: SHIPPED
Start: 2024-01-19 | End: 2024-01-19

## 2024-01-19 RX ORDER — POTASSIUM CHLORIDE 20 MEQ/1
20 TABLET, EXTENDED RELEASE ORAL DAILY
Qty: 7 TABLET | Refills: 0 | Status: SHIPPED
Start: 2024-01-19 | End: 2024-01-19

## 2024-01-19 RX ORDER — LISINOPRIL 20 MG/1
20 TABLET ORAL DAILY
Qty: 30 TABLET | Refills: 1 | Status: SHIPPED
Start: 2024-01-19 | End: 2024-01-19

## 2024-01-19 RX ORDER — ASPIRIN 81 MG/1
81 TABLET ORAL 2 TIMES DAILY
Qty: 20 TABLET | Refills: 0 | Status: SHIPPED | OUTPATIENT
Start: 2024-01-19 | End: 2024-01-29

## 2024-01-19 RX ORDER — POTASSIUM CHLORIDE 20 MEQ/1
20 TABLET, EXTENDED RELEASE ORAL DAILY
Qty: 7 TABLET | Refills: 0 | Status: SHIPPED | OUTPATIENT
Start: 2024-01-19 | End: 2024-01-26

## 2024-01-19 RX ORDER — LISINOPRIL 20 MG/1
20 TABLET ORAL DAILY
Qty: 30 TABLET | Refills: 1 | Status: SHIPPED | OUTPATIENT
Start: 2024-01-19

## 2024-01-19 RX ADMIN — OXYCODONE 5 MG: 5 TABLET ORAL at 04:47

## 2024-01-19 RX ADMIN — LISINOPRIL 20 MG: 20 TABLET ORAL at 04:47

## 2024-01-19 RX ADMIN — POTASSIUM CHLORIDE 60 MEQ: 1500 TABLET, EXTENDED RELEASE ORAL at 08:36

## 2024-01-19 RX ADMIN — OMEPRAZOLE 40 MG: 20 CAPSULE, DELAYED RELEASE ORAL at 04:45

## 2024-01-19 RX ADMIN — ONDANSETRON 4 MG: 2 INJECTION INTRAMUSCULAR; INTRAVENOUS at 13:57

## 2024-01-19 RX ADMIN — NICOTINE 14 MG: 14 PATCH TRANSDERMAL at 04:46

## 2024-01-19 RX ADMIN — OXYCODONE 5 MG: 5 TABLET ORAL at 11:47

## 2024-01-19 RX ADMIN — FUROSEMIDE 20 MG: 10 INJECTION, SOLUTION INTRAVENOUS at 08:36

## 2024-01-19 RX ADMIN — OXYCODONE 5 MG: 5 TABLET ORAL at 15:58

## 2024-01-19 RX ADMIN — FLUOXETINE HYDROCHLORIDE 40 MG: 20 CAPSULE ORAL at 04:45

## 2024-01-19 RX ADMIN — OXYCODONE 5 MG: 5 TABLET ORAL at 08:36

## 2024-01-19 RX ADMIN — DOCUSATE SODIUM 50 MG AND SENNOSIDES 8.6 MG 2 TABLET: 8.6; 5 TABLET, FILM COATED ORAL at 04:45

## 2024-01-19 ASSESSMENT — LIFESTYLE VARIABLES
TOTAL SCORE: 0
CONSUMPTION TOTAL: NEGATIVE
HAVE PEOPLE ANNOYED YOU BY CRITICIZING YOUR DRINKING: NO
TOTAL SCORE: 0
HAVE YOU EVER FELT YOU SHOULD CUT DOWN ON YOUR DRINKING: NO
AVERAGE NUMBER OF DAYS PER WEEK YOU HAVE A DRINK CONTAINING ALCOHOL: 0
EVER FELT BAD OR GUILTY ABOUT YOUR DRINKING: NO
EVER HAD A DRINK FIRST THING IN THE MORNING TO STEADY YOUR NERVES TO GET RID OF A HANGOVER: NO
HOW MANY TIMES IN THE PAST YEAR HAVE YOU HAD 5 OR MORE DRINKS IN A DAY: 0
TOTAL SCORE: 0
ALCOHOL_USE: YES
ON A TYPICAL DAY WHEN YOU DRINK ALCOHOL HOW MANY DRINKS DO YOU HAVE: 0

## 2024-01-19 ASSESSMENT — COGNITIVE AND FUNCTIONAL STATUS - GENERAL
DRESSING REGULAR LOWER BODY CLOTHING: A LITTLE
HELP NEEDED FOR BATHING: A LITTLE
DAILY ACTIVITIY SCORE: 21
SUGGESTED CMS G CODE MODIFIER DAILY ACTIVITY: CJ
TOILETING: A LITTLE

## 2024-01-19 ASSESSMENT — PAIN DESCRIPTION - PAIN TYPE
TYPE: ACUTE PAIN

## 2024-01-19 ASSESSMENT — PATIENT HEALTH QUESTIONNAIRE - PHQ9
1. LITTLE INTEREST OR PLEASURE IN DOING THINGS: NOT AT ALL
2. FEELING DOWN, DEPRESSED, IRRITABLE, OR HOPELESS: NOT AT ALL
SUM OF ALL RESPONSES TO PHQ9 QUESTIONS 1 AND 2: 0

## 2024-01-19 NOTE — PROGRESS NOTES
"      Orthopaedic Progress Note    Interval changes:  Patient doing well   LLE dressings CDI  Cleared for DC to home by ortho pending medicine clearance    ROS - Patient denies any new issues.  Pain well controlled.    BP (!) 154/88   Pulse 92   Temp 36.6 °C (97.9 °F) (Temporal)   Resp 17   Ht 1.6 m (5' 2.99\")   Wt 75.8 kg (167 lb)   SpO2 92%     Patient seen and examined  No acute distress  Breathing non labored  RRR  LLE dressings CDI, DNVI, moves all toes, cap refill <2 sec.     Recent Labs     01/17/24  0739 01/18/24  0237 01/19/24  0322   WBC 6.1 7.7 7.2   RBC 2.94* 3.19* 2.96*   HEMOGLOBIN 9.0* 9.9* 9.2*   HEMATOCRIT 26.9* 29.7* 27.9*   MCV 91.5 93.1 94.3   MCH 30.6 31.0 31.1   MCHC 33.5 33.3 33.0   RDW 39.8 40.0 41.6   PLATELETCT 265 322 357   MPV 9.7 9.2 9.3       Active Hospital Problems    Diagnosis     Hypokalemia [E87.6]     Hypertension [I10]     Severely comminuted fracture of the left distal femur [T14.8XXA]     Tobacco dependence [F17.200]     Hypertensive urgency [I16.0]     Overweight (BMI 25.0-29.9) [E66.3]     Anxiety [F41.9]     Hyponatremia [E87.1]     Dehydration [E86.0]     Gastroesophageal reflux disease [K21.9]        Assessment/Plan:  Patient doing well   LLE dressings CDI  Cleared for DC to home by ortho pending medicine clearance  POD#7 S/P Open treatment with internal fixation of right supracondylar femur fracture without intercondylar extension.  Wt bearing status - TTWB LLE  Wound care/Drains - Dressings to be changed every other day by nursing. Or PRN for saturation starting POD#2  Future Procedures - none planned   Lovenox: Start 1/14, Duration-until ambulatory > 150'  Sutures/Staples out- 14-21 days post operatively. Removal will completed by ortho mid levels only.  PT/OT-initiated  Antibiotics: Perioperative completed  DVT Prophylaxis- TEDS/SCDs/Foot pumps  Flanagan-not needed per ortho  Case Coordination for Discharge Planning - Disposition per therapy recs.    "

## 2024-01-19 NOTE — PROGRESS NOTES
Hospital Medicine Daily Progress Note    Date of Service  1/18/2024    Chief Complaint  Windy León is a 68 y.o. female admitted 1/12/2024 with GLF (MGLF on ice today, denies hitting head / LOC) and Leg Injury (LLE swelling / pain in L thigh s/p GLF today, hx osteoporosis and L hip replacement, +CMS distal to injury)    Hospital Course  68 year old female presented with fall, injuring left leg. Imaging showed left displaced, comminuted supracondylar femur fracture s/p ORIF by Dr. Amezquita, Ortho 1/12. Leukocytosis trending down 15-12.    She was having pain and muscle spasms. Ordred Valium. Pain is now better controlled. Ortho recs: TTWB PT/OT mobilization ASAP.  Mild hyponatremia, monitor.     PT/OT pending. Pain improved PT/OT recommends skilled but patient prefers home (she lives on a mobile home) or rehab    Interval Problem Update  1/16: Tearful initially did not want to go to rehab but discussed with physiatrist and patient finally agreed.  Replating potassium.  1/17: In the morning, patient again changed her mind and wanted to go home.  After further discussion, she was agreeable to rehab.    1/18: Long discussion with patient with friend at bedside.  Patient says she has lots of help at home.  She does not want to go to SNF.  Repleting potassium.  Added lisinopril for hypertension.  Ordered and was accepted by home health.  Total time 56 minutes.    I have discussed this patient's plan of care and discharge plan at IDT rounds today with Case Management, Nursing, Nursing leadership, and other members of the IDT team.    Disposition  The patient is not medically cleared for discharge to home or a post-acute facility.  Anticipate discharge to: home with close outpatient follow-up  I have placed the appropriate orders for post-discharge needs.    Review of Systems  Review of Systems   Constitutional:  Negative for chills and fever.   Respiratory:  Negative for cough and shortness of breath.    Cardiovascular:   Negative for chest pain.   Gastrointestinal:  Negative for blood in stool, constipation, nausea and vomiting.   Genitourinary:  Negative for dysuria.   Musculoskeletal:  Positive for falls, joint pain and myalgias.   Skin:  Negative for rash.   Neurological:  Negative for dizziness and headaches.   Psychiatric/Behavioral:  The patient is not nervous/anxious and does not have insomnia.         Physical Exam  Temp:  [36.2 °C (97.2 °F)-36.8 °C (98.2 °F)] 36.4 °C (97.5 °F)  Pulse:  [94-99] 94  Resp:  [16-18] 16  BP: (147-179)/(79-95) 158/92  SpO2:  [91 %-95 %] 95 %    Physical Exam  Vitals and nursing note reviewed.   Constitutional:       General: She is not in acute distress.  HENT:      Head: Normocephalic and atraumatic.      Nose: Nose normal.      Mouth/Throat:      Mouth: Mucous membranes are moist.   Cardiovascular:      Rate and Rhythm: Normal rate and regular rhythm.   Pulmonary:      Effort: Pulmonary effort is normal. No respiratory distress.      Breath sounds: No wheezing or rales.   Abdominal:      General: Abdomen is flat. There is no distension.      Tenderness: There is no abdominal tenderness. There is no guarding.   Musculoskeletal:         General: Tenderness (L leg soreness) present. No swelling or deformity.      Cervical back: No rigidity.   Skin:     General: Skin is warm.      Coloration: Skin is not jaundiced.   Neurological:      General: No focal deficit present.      Mental Status: She is alert and oriented to person, place, and time.   Psychiatric:         Mood and Affect: Mood normal.         Thought Content: Thought content normal.         Fluids    Intake/Output Summary (Last 24 hours) at 1/18/2024 1740  Last data filed at 1/18/2024 1102  Gross per 24 hour   Intake --   Output 3000 ml   Net -3000 ml         Laboratory  Recent Labs     01/16/24  0021 01/17/24  0739 01/18/24  0237   WBC 10.7 6.1 7.7   RBC 2.78* 2.94* 3.19*   HEMOGLOBIN 8.9* 9.0* 9.9*   HEMATOCRIT 25.9* 26.9* 29.7*   MCV  93.2 91.5 93.1   MCH 32.0 30.6 31.0   MCHC 34.4 33.5 33.3   RDW 41.1 39.8 40.0   PLATELETCT 211 265 322   MPV 9.7 9.7 9.2       Recent Labs     01/16/24  0021 01/17/24  0739 01/18/24  0237   SODIUM 135 140 138   POTASSIUM 3.3* 3.5* 3.1*   CHLORIDE 99 104 102   CO2 24 26 27   GLUCOSE 123* 109* 104*   BUN 8 6* 7*   CREATININE 0.41* 0.30* 0.30*   CALCIUM 8.1* 8.5 8.6                     Imaging  DX-PORTABLE FLUORO > 1 HOUR   Final Result      Portable fluoroscopy utilized for 1 minute 37 seconds.         INTERPRETING LOCATION: [address] [place] [address], [address]      DX-KNEE 2- LEFT   Final Result         1.  Intraoperative changes of distal femoral ORIF in progress      CT-KNEE W/O PLUS RECONS LEFT   Final Result      Comminuted distal femur fracture. No definite extension to the articular surface.      DX-KNEE 3 VIEWS LEFT   Final Result      1.  Comminuted and impacted fracture of the distal left femur without evidence of articular extension. Persistent posterior angulation post reduction.   2.  Osteopenia.      DX-KNEE 3 VIEWS LEFT   Final Result      1.  Partially visualized, comminuted fracture of the distal left femur without evidence of articular extension.   2.  Osteopenia.      DX-FEMUR-2+ LEFT   Final Result      1.  Severely comminuted fracture of the distal femur. Recommend knee radiographs to more confidently exclude extension to the knee joint.   2.  Prior LEFT hip arthroplasty   3.  Osteopenia   4.  Osteoarthritis      DX-PELVIS-1 OR 2 VIEWS   Final Result      1.  Deformity of the RIGHT pubic bone compatible with age indeterminate trauma. This could be acute or chronic fracture. Comparison with any prior images would be helpful.   2.  Prior LEFT hip arthroplasty   3.  Osteopenia   4.  Osteoarthritis           Assessment/Plan  * Severely comminuted fracture of the left distal femur- (present on admission)  Assessment & Plan  Orthopedics consulted,  S/p ORIF 1/12.   Ordered antispasmodic. Monitor  "mentation/sedation score, respirations blood ressure if on narcotics.  Patient now agreeable to go to rehab versus SNF although she continues to have doubts.    Hypertension  Assessment & Plan  Started on lisinopril    Hypokalemia- (present on admission)  Assessment & Plan  Replating as needed    Dehydration- (present on admission)  Assessment & Plan  Likely secondary to reduced oral intake.   Encourage oral intake as tolerated, antiemetics as needed.    Hyponatremia- (present on admission)  Assessment & Plan  Resolved with IVF    Anxiety- (present on admission)  Assessment & Plan  Resume fluoxetine   As needed alprazolam    Overweight (BMI 25.0-29.9)- (present on admission)  Assessment & Plan  The patient has an elevated BMI of 29.  She is at higher risk for postoperative hypoxia. I will order continuous pulse oximetry  Emphasized incentive spirometry, consider BiPAP/CPAP postprocedure  \"    Recommended weight loss advised, 5% through reduced calorie, low carb diet and 150 mins of exercise a week once better  Recommend bariatric surgery evaluation if morbidly obese  Educated on the increase of morbidity and mortality associated with excess weight including DM, Heart Disease, HTN, stroke, and sleep apnea. Recommended outpatient monitoring  of blood sugars, lipid panel, sleep study evaluation for metabolic syndrome.        Hypertensive urgency- (present on admission)  Assessment & Plan  Now improved    Tobacco dependence- (present on admission)  Assessment & Plan  Counseled on admission         VTE prophylaxis: Ordered Lovenox ppx    I have performed a physical exam and reviewed and updated ROS and Plan today (1/18/2024). In review of yesterday's note (1/17/2024), there are no changes except as documented above.        "

## 2024-01-19 NOTE — DISCHARGE PLANNING
Received Choice form at [phone]  Agency/Facility Name: [facility]  Referral sent per Choice form @ [phone]   =

## 2024-01-19 NOTE — CARE PLAN
The patient is Stable - Low risk of patient condition declining or worsening    Shift Goals  Clinical Goals: Pain control, trend labs, reduced anxiety  Patient Goals: Pain control  Family Goals: No family at bedside    Progress made toward(s) clinical / shift goals:    Problem: Pain - Standard  Goal: Alleviation of pain or a reduction in pain to the patient’s comfort goal  Description: Target End Date:  Prior to discharge or change in level of care    Document on Vitals flowsheet    1.  Document pain using the appropriate pain scale per order or unit policy  2.  Educate and implement non-pharmacologic comfort measures (i.e. relaxation, distraction, massage, cold/heat therapy, etc.)  3.  Pain management medications as ordered  4.  Reassess pain after pain med administration per policy  5.  If opiods administered assess patient's response to pain medication is appropriate per POSS sedation scale  6.  Follow pain management plan developed in collaboration with patient and interdisciplinary team (including palliative care or pain specialists if applicable)  Outcome: Progressing     Problem: Skin Integrity  Goal: Skin integrity is maintained or improved  Description: Target End Date:  Prior to discharge or change in level of care    Document interventions on Skin Risk/Sundeep flowsheet groups and corresponding LDA    1.  Assess and monitor skin integrity, appearance and/or temperature  2.  Assess risk factors for impaired skin integrity and/or pressures ulcers  3.  Implement precautions to protect skin integrity in collaboration with interdisciplinary team  4.  Implement pressure ulcer prevention protocol if at risk for skin breakdown  5.  Confirm wound care consult if at risk for skin breakdown  6.  Ensure patient use of pressure relieving devices  (Low air loss bed, waffle overlay, heel protectors, ROHO cushion, etc)  Outcome: Progressing       Patient is not progressing towards the following goals:

## 2024-01-19 NOTE — THERAPY
"Occupational Therapy  Daily Treatment     Patient Name: Windy León  Age:  68 y.o., Sex:  female  Medical Record #: [id number]  Today's Date: 1/19/2024     Precautions: Fall Risk, Toe Touch Weight Bearing Left Lower Extremity    Assessment    Pt seen for OT tx. Received in bed, agreeable to OOB. Pt suddenly expressed bowel urgency and requested bed pan (unable to attempt commode use). She rolled for placement but required full assist for hygiene due to loose stool. Pt required slight min A to support LLE during transition to EOB; demos very good trunk and UB control. Pivoted to chair with FWW, then trained pt on use of reacher and sock-aid for compensatory DME. Recommended pt consider wearing dresses/skirts/gowns for ease of toileting at home. Educated on obtaining BSC; pt amenable. Pt then requested return to bed to urinate using Purewick. OT provided BSC and pt was able to transfer to/from and perform hygiene. Once back up to chair, pt requested BTB due to fatigue from activity. Pt able to manage LLE back onto bed without assist. Pt reports her friend Nidia will stay with her and assist as needed. She is eager to begin HH as SNF is not an option. Will continue to follow pt for OT while in-house to maximize functional independence and safety.     Plan    Treatment Plan Status: Continue Current Treatment Plan  Type of Treatment: Self Care / Activities of Daily Living, Adaptive Equipment, Therapeutic Exercises, Therapeutic Activity  Treatment Frequency: 5 Times per Week  Treatment Duration: Until Therapy Goals Met    DC Equipment Recommendations: Bed Side Commode, Reacher, Sock Aide  Discharge Recommendations: Recommend post-acute placement for additional occupational therapy services prior to discharge home. If pt is not eligible, home with HH and support from friends is next best option.     Subjective    \"I just moved my leg!\" (Pt demonstrated active knee flexion seated EOB, able to achieve 80 degrees.)   "   Objective       01/19/24 1008   Vitals   Pulse Oximetry 90 %   O2 (LPM) 0   O2 Delivery Device None - Room Air   Other Treatments   Other Treatments Provided Extensive discussion/brain-storming strategies for functional ADL in home setting   Balance   Sitting Balance (Static) Fair +   Sitting Balance (Dynamic) Fair +   Standing Balance (Static) Fair   Standing Balance (Dynamic) Fair -   Weight Shift Sitting Fair   Weight Shift Standing Absent   Comments FWW for standing   Bed Mobility    Supine to Sit Minimal Assist  (min support to LLE)   Sit to Supine Contact Guard Assist  (able to manage LLE back onto bed)   Scooting Supervised  (seated)   Rolling Contact Guard Assist  (using rails)   Activities of Daily Living   Lower Body Dressing Supervision  (doff/don B socks using AE)   Toileting Maximal Assist  (bowel urgency in bed resulting in incontinence requiring total A for hygiene; later pt used commode for urination and was able to perform hygiene)   Functional Mobility   Sit to Stand Contact Guard Assist   Bed, Chair, Wheelchair Transfer Contact Guard Assist   Toilet Transfers Contact Guard Assist  (BSC)   Transfer Method Stand Pivot  (FWW)   Mobility Rolling, supine > EOB > < chair > < BSC   Activity Tolerance   Sitting in Chair 8 min on BSC, 12 min in chair   Sitting Edge of Bed 7 min   Standing 2 min total   Comments OOB limited by fatigue (pt had been up to bathroom earlier with RN)   Short Term Goals   Short Term Goal # 1 Pt will complete LB dressing with supv using AE PRN   Goal Outcome # 1 Progressing as expected   Short Term Goal # 2 Pt will complete ADL txfs with SBA   Goal Outcome # 2 Progressing as expected   Short Term Goal # 3 Pt will complete toileting with hygiene with SBA   Goal Outcome # 3 Progressing as expected   Education Group   Home Safety Patient Response Patient;Acceptance;Explanation;Verbal Demonstration  (use of shower chair with HH; CGA for all transfers in home setting)   ADL Patient  Response Patient;Acceptance;Explanation;Verbal Demonstration  (optimal clothing choices for ease of toileting)   Adaptive Equipment Patient Response Patient;Acceptance;Explanation;Demonstration;Handout;Verbal Demonstration;Action Demonstration  (use of reacher and sock-aid for compensatory LB dressing; use of reacher for object retrieval)

## 2024-01-19 NOTE — DISCHARGE PLANNING
Case Management Discharge Planning    Admission Date: 1/12/2024  GMLOS: 4.5  ALOS: 7    6-Clicks ADL Score: 21  6-Clicks Mobility Score: 14  PT and/or OT Eval ordered: Yes  Post-acute Referrals Ordered: Yes  Post-acute Choice Obtained: No  Has referral(s) been sent to post-acute provider:  No      Anticipated Discharge Dispo: Discharge Disposition: D/T to home under A care in anticipation of covered skilled care (06)  Discharge Address: [address] [place] [address] [place] [address]    DME Needed: Yes,FWW    DME Ordered: Yes    Action(s) Taken: Updated Provider/Nurse on Discharge Plan    LSW met with patient at bedside to obtain choice for FWW. Patient reported to be eager to return home and provided choice for [facility].     Choice form faxed to ANGELA GONZALEZ     No other CM needs identified at this time.     Escalations Completed: None    Medically Clear: Yes

## 2024-01-19 NOTE — PROGRESS NOTES
"      Orthopaedic Progress Note    Interval changes:  Patient doing well   LLE dressings to be changed by nursing  Cleared for DC to rehab by ortho pending medicine clearance    ROS - Patient denies any new issues.  Pain well controlled.    BP (!) 147/81   Pulse 94   Temp 36.2 °C (97.2 °F) (Temporal)   Resp 16   Ht 1.6 m (5' 3\")   Wt 75.8 kg (167 lb)   SpO2 93%     Patient seen and examined  No acute distress  Breathing non labored  RRR  LLE dressings CDI, DNVI, moves all toes, cap refill <2 sec.     Recent Labs     01/16/24  0021 01/17/24  0739 01/18/24  0237   WBC 10.7 6.1 7.7   RBC 2.78* 2.94* 3.19*   HEMOGLOBIN 8.9* 9.0* 9.9*   HEMATOCRIT 25.9* 26.9* 29.7*   MCV 93.2 91.5 93.1   MCH 32.0 30.6 31.0   MCHC 34.4 33.5 33.3   RDW 41.1 39.8 40.0   PLATELETCT 211 265 322   MPV 9.7 9.7 9.2       Active Hospital Problems    Diagnosis     Hypokalemia [E87.6]     Hypertension [I10]     Severely comminuted fracture of the left distal femur [T14.8XXA]     Tobacco dependence [F17.200]     Hypertensive urgency [I16.0]     Overweight (BMI 25.0-29.9) [E66.3]     Anxiety [F41.9]     Hyponatremia [E87.1]     Dehydration [E86.0]     Gastroesophageal reflux disease [K21.9]        Assessment/Plan:  Patient doing well   LLE dressings to be changed by nursing  Cleared for DC to rehab by ortho pending medicine clearance  POD#6 S/P Open treatment with internal fixation of right supracondylar femur fracture without intercondylar extension.  Wt bearing status - TTWB LLE  Wound care/Drains - Dressings to be changed every other day by nursing. Or PRN for saturation starting POD#2  Future Procedures - none planned   Lovenox: Start 1/14, Duration-until ambulatory > 150'  Sutures/Staples out- 14-21 days post operatively. Removal will completed by ortho mid levels only.  PT/OT-initiated  Antibiotics: Perioperative completed  DVT Prophylaxis- TEDS/SCDs/Foot pumps  Flanagan-not needed per ortho  Case Coordination for Discharge Planning - " Disposition per therapy recs.

## 2024-01-20 NOTE — PROGRESS NOTES
Pt d/c with family, paperwork reviewed and signed, PIV removed. All belongings and meds to beds d/c with patient.

## 2024-01-20 NOTE — DISCHARGE SUMMARY
Hospital Medicine Discharge Note     Admit Date:  1/12/2024       Discharge Date:   1/19/2024    Attending Physician:  Pablo Almodovar M.D.     Diagnoses (includes active and resolved):   Principal Problem:    Severely comminuted fracture of the left distal femur (POA: Yes)  Active Problems:    Tobacco dependence (POA: Yes)    Hypertensive urgency (POA: Yes)    Overweight (BMI 25.0-29.9) (POA: Yes)    Anxiety (POA: Yes)    Hyponatremia (POA: Yes)    Dehydration (POA: Yes)    Gastroesophageal reflux disease (POA: Unknown)    Hypokalemia (POA: Yes)    Hypertension (POA: Unknown)  Resolved Problems:    * No resolved hospital problems. *      Hospital Summary (Brief Narrative):       68 year old female presented with fall, injuring left leg. Imaging showed left displaced, comminuted supracondylar femur fracture s/p ORIF by Dr. Amezquita, Ortho 1/12.  Ortho recs: [facility]   Pain improved PT/OT recommends skilled but patient prefers home (she lives on a mobile home) or rehab.  Patient was not accepted by rehab given her insurance.  Patient declined to go to SNF.  She wanted to go home and excepted risks of not progressing.  She feels that she has very good support at home from 4 different people.  She was able to be weaned off of oxygen.  She did have some hypokalemia so a short course of potassium was prescribed.  She had persistent hypertension so she was started on lisinopril.     The patient met 2-midnight criteria for an inpatient stay at the time of discharge.     Consultants:      Orthopedic surgeon Dr. Amezquita  Rehab Dr. Lopez    Procedures:        DATE OF SERVICE:  01/12/2024      PREOPERATIVE DIAGNOSIS:  Left comminuted displaced supracondylar femur   fracture.     POSTOPERATIVE DIAGNOSIS:  Left comminuted displaced supracondylar femur   fracture.     PROCEDURE PERFORMED:  Open treatment with internal fixation of right   supracondylar femur fracture without intercondylar extension.    Discharge Medications:            Medication List        START taking these medications        Instructions   Aspirin Low Dose 81 MG EC tablet  Generic drug: aspirin   Take 1 Tablet by mouth 2 times a day for 10 days.  Dose: 81 mg     HYDROcodone-acetaminophen 5-325 MG Tabs per tablet  Commonly known as: Norco  Replaces: HYDROcodone/acetaminophen  MG Tabs   Take 1 Tablet by mouth every 8 hours as needed (pain) for up to 5 days.  Dose: 1 Tablet     lisinopril 20 MG Tabs  Commonly known as: Prinivil   Take 1 Tablet by mouth every day.  Dose: 20 mg     potassium chloride SA 20 MEQ Tbcr  Commonly known as: Kdur   Take 1 Tablet by mouth every day for 7 days.  Dose: 20 mEq            CONTINUE taking these medications        Instructions   albuterol 108 (90 Base) MCG/ACT Aers inhalation aerosol   Inhale 1-2 Puffs every four hours as needed for Shortness of Breath.  Dose: 1-2 Puff     Alive Once Daily Womens Tabs   Take 1 Tablet by mouth every morning.  Dose: 1 Tablet     ALPRAZolam 0.5 MG Tabs  Commonly known as: Xanax   Take 0.5 mg by mouth 2 times a day as needed for Anxiety.  Dose: 0.5 mg     CHOLESTOFF PO   Take 2 Tablets by mouth every morning.  Dose: 2 Tablet     CITRACAL PO   Take 1 Tablet by mouth every morning.  Dose: 1 Tablet     cyclobenzaprine 5 mg tablet  Commonly known as: Flexeril   Take 5 mg by mouth 3 times a day as needed for Muscle Spasms.  Dose: 5 mg     fluoxetine 40 MG capsule  Commonly known as: PROzac   Take 40 mg by mouth 2 times a day.  Dose: 40 mg     omeprazole 40 MG delayed-release capsule  Commonly known as: PriLOSEC   Take 40 mg by mouth 2 times a day.  Dose: 40 mg     Vitamin D-3 125 MCG (5000 UT) Tabs   Take 10,000 Units by mouth every morning.  Dose: 10,000 Units            STOP taking these medications      HYDROcodone/acetaminophen  MG Tabs  Commonly known as: Norco  Replaced by: HYDROcodone-acetaminophen 5-325 MG Tabs per tablet            Disposition:   Discharge home    Activity:   As tolerated    Code  "status:   Full code    Primary Care Provider:    Denver J Miller, M.D.    Follow up appointment details :      No follow-up provider specified.  No future appointments.    Pending Studies:        None    Time spent on discharge day patient visit: 42 minutes    #################################################    Interval history/exam for day of discharge:    Vitals:    01/19/24 0747 01/19/24 1008 01/19/24 1300 01/19/24 1538   BP: (!) 154/88   133/89   Pulse: 92   (!) 107   Resp: 17   17   Temp: 36.6 °C (97.9 °F)   36.7 °C (98.1 °F)   TempSrc: Temporal   Temporal   SpO2: 95% 90% 92% 95%   Weight:       Height:         Weight/BMI: Body mass index is 29.59 kg/m².  Pulse Oximetry: 95 %, O2 (LPM): 0, O2 Delivery Device: None - Room Air    Most Recent Labs:    Lab Results   Component Value Date/Time    WBC 7.2 01/19/2024 03:22 AM    RBC 2.96 (L) 01/19/2024 03:22 AM    HEMOGLOBIN 9.2 (L) 01/19/2024 03:22 AM    HEMATOCRIT 27.9 (L) 01/19/2024 03:22 AM    MCV 94.3 01/19/2024 03:22 AM    MCH 31.1 01/19/2024 03:22 AM    MCHC 33.0 01/19/2024 03:22 AM    MPV 9.3 01/19/2024 03:22 AM    NEUTSPOLYS 50.30 01/12/2024 09:05 AM    LYMPHOCYTES 39.30 01/12/2024 09:05 AM    MONOCYTES 8.60 01/12/2024 09:05 AM    EOSINOPHILS 0.80 01/12/2024 09:05 AM    BASOPHILS 0.70 01/12/2024 09:05 AM      Lab Results   Component Value Date/Time    SODIUM 139 01/19/2024 03:22 AM    POTASSIUM 3.2 (L) 01/19/2024 03:22 AM    CHLORIDE 103 01/19/2024 03:22 AM    CO2 24 01/19/2024 03:22 AM    GLUCOSE 103 (H) 01/19/2024 03:22 AM    BUN 6 (L) 01/19/2024 03:22 AM    CREATININE 0.31 (L) 01/19/2024 03:22 AM      Lab Results   Component Value Date/Time    ALTSGPT 15 01/13/2024 05:41 AM    ASTSGOT 23 01/13/2024 05:41 AM    ALKPHOSPHAT 86 01/13/2024 05:41 AM    TBILIRUBIN 0.3 01/13/2024 05:41 AM    LIPASE 17 12/11/2018 03:30 PM    ALBUMIN 2.9 (L) 01/19/2024 03:22 AM    GLOBULIN 2.2 01/13/2024 05:41 AM     No results found for: \"PROTHROMBTM\", \"INR\"     Imaging/ Testing:  "     DX-CHEST-PORTABLE (1 VIEW)   Final Result      DX-PORTABLE FLUORO > 1 HOUR   Final Result      Portable fluoroscopy utilized for 1 minute 37 seconds.         INTERPRETING LOCATION: [facility], [place], [address]      DX-KNEE 2- LEFT   Final Result         1.  Intraoperative changes of distal femoral ORIF in progress      CT-KNEE W/O PLUS RECONS LEFT   Final Result      Comminuted distal femur fracture. No definite extension to the articular surface.      DX-KNEE 3 VIEWS LEFT   Final Result      1.  Comminuted and impacted fracture of the distal left femur without evidence of articular extension. Persistent posterior angulation post reduction.   2.  Osteopenia.      DX-KNEE 3 VIEWS LEFT   Final Result      1.  Partially visualized, comminuted fracture of the distal left femur without evidence of articular extension.   2.  Osteopenia.      DX-FEMUR-2+ LEFT   Final Result      1.  Severely comminuted fracture of the distal femur. Recommend knee radiographs to more confidently exclude extension to the knee joint.   2.  Prior LEFT hip arthroplasty   3.  Osteopenia   4.  Osteoarthritis      DX-PELVIS-1 OR 2 VIEWS   Final Result      1.  Deformity of the RIGHT pubic bone compatible with age indeterminate trauma. This could be acute or chronic fracture. Comparison with any prior images would be helpful.   2.  Prior LEFT hip arthroplasty   3.  Osteopenia   4.  Osteoarthritis          Instructions:      The patient was instructed to return to the ER in the event of worsening symptoms. I have counseled the patient on the importance of compliance and the patient has agreed to proceed with all medical recommendations and follow up plan indicated above.   The patient understands that all medications come with benefits and risks. Risks may include permanent injury or death and these risks can be minimized with close reassessment and monitoring.

## 2024-01-31 NOTE — DOCUMENTATION QUERY
Pending sale to [facility]                                                                       Query Response Note      PATIENT:               LETY HERNANDEZ  ACCT #:                  [id number]  MRN:                     [id number]  :                      1955  ADMIT DATE:       2024 9:00 AM  DISCH DATE:        2024 6:08 PM  RESPONDING  PROVIDER #:        [id number]           QUERY TEXT:    When both osteoporosis and a trauma occur with a resulting fracture, coding clinic directs the  to query the physician for clarification of the type of fracture. Based on clinical findings, risk factors and treatment, can this fracture be further specified as:    NOTE:  If an appropriate response is not listed below, please respond with a new note.      The patient's Clinical Indicators include:  68 y.o. female who presents after mechanical ground-level fall. Found to have a left comminuted displaced supracondylar femur fracture.    H&P: History of osteoporosis and left hip replacement.      CT left femur: diffuse demineralization of the bones.    Treatment: Open treatment with internal fixation    Risk factors: fall, osteoporosis     Thank you,  [email or web address]  Options provided:   -- Pathological fracture secondary to osteoporosis   -- Traumatic fracture   -- Unable to determine      Query created by: Delmi Schmitt on 2024 6:11 AM    RESPONSE TEXT:    Pathological fracture secondary to osteoporosis          Electronically signed by:  MARLEEN MATA MD 2024 7:51 PM

## (undated) DEVICE — WRAP COBAN SELF-ADHERENT 6 IN X  5YDS STERILE TAN (12/CA)

## (undated) DEVICE — SUCTION INSTRUMENT YANKAUER BULBOUS TIP W/O VENT (50EA/CA)

## (undated) DEVICE — BANDAGE ELASTIC STERILE MATRIX 6 X 10 (20EA/CA)

## (undated) DEVICE — DRESSING ABDOMINAL PAD STERILE 8 X 10" (360EA/CA)"

## (undated) DEVICE — STAPLER SKIN DISP - (6/BX 10BX/CA) VISISTAT

## (undated) DEVICE — COVER LIGHT HANDLE ALC PLUS DISP (18EA/BX)

## (undated) DEVICE — GLOVE BIOGEL PI INDICATOR SZ 7.0 SURGICAL PF LF - (50/BX 4BX/CA)

## (undated) DEVICE — DRAPE U ORTHOPEDIC - (10/BX)

## (undated) DEVICE — GLOVE SZ 7 BIOGEL PI MICRO - PF LF (50PR/BX 4BX/CA)

## (undated) DEVICE — GOWN WARMING STANDARD FLEX - (30/CA)

## (undated) DEVICE — BANDAGE ELASTIC STERILE VELCRO 6 X 5 YDS (25EA/CA)

## (undated) DEVICE — GLOVE BIOGEL PI ORTHO SZ 7 PF LF (40PR/BX)

## (undated) DEVICE — TUBE E-T HI-LO CUFF 6.5MM (10EA/BX)

## (undated) DEVICE — GOWN SURGEONS X-LARGE - DISP. (30/CA)

## (undated) DEVICE — WRAP CO-FLEX 4IN X 5YD STERIL - SELF-ADHERENT (18/CA)

## (undated) DEVICE — LACTATED RINGERS INJ 1000 ML - (14EA/CA 60CA/PF)

## (undated) DEVICE — DRESSING LEUKOMED STERILE 11.75X4IN - (50/CA)

## (undated) DEVICE — SPONGE GAUZESTER 4 X 4 4PLY - (128PK/CA)

## (undated) DEVICE — CHLORAPREP 26 ML APPLICATOR - ORANGE TINT(25/CA)

## (undated) DEVICE — SUCTION INSTRUMENT YANKAUER OPEN TIP W/O VENT (50EA/CA)

## (undated) DEVICE — SENSOR OXIMETER ADULT SPO2 RD SET (20EA/BX)

## (undated) DEVICE — GLOVE BIOGEL PI ORTHO SZ 6 1/2 SURGICAL PF LF (40PR/BX)

## (undated) DEVICE — PADDING CAST 6 IN STERILE - 6 X 4 YDS (24/CA)

## (undated) DEVICE — SUTURE GENERAL

## (undated) DEVICE — BLADE SURGICAL #10 - (50/BX)

## (undated) DEVICE — TUBING CLEARLINK DUO-VENT - C-FLO (48EA/CA)

## (undated) DEVICE — SET EXTENSION WITH 2 PORTS (48EA/CA) ***PART #2C8610 IS A SUBSTITUTE*****

## (undated) DEVICE — SUTURE 2-0 VICRYL PLUS CTX - 36 INCH (36/BX)

## (undated) DEVICE — DRAPE 36X28IN RAD CARM BND BG - (25/CA) O

## (undated) DEVICE — PACK MAJOR ORTHO - (2EA/CA)

## (undated) DEVICE — K-WIRE WITH DRILL TIP 2.0 X 234MM

## (undated) DEVICE — DRAPE SURG STERI-DRAPE 7X11OD - (40EA/CA)

## (undated) DEVICE — DRAPE LARGE 3 QUARTER - (20/CA)

## (undated) DEVICE — SODIUM CHL IRRIGATION 0.9% 1000ML (12EA/CA)

## (undated) DEVICE — GLOVE BIOGEL INDICATOR SZ 7.5 SURGICAL PF LTX - (50PR/BX 4BX/CA)

## (undated) DEVICE — DRAPE C ARMOR (12EA/CA)

## (undated) DEVICE — STOCKINET TUBULAR 6IN STERILE - 6 X 48YDS (25/CA)

## (undated) DEVICE — CANISTER SUCTION 3000ML MECHANICAL FILTER AUTO SHUTOFF MEDI-VAC NONSTERILE LF DISP  (40EA/CA)

## (undated) DEVICE — ELECTRODE DUAL RETURN W/ CORD - (50/PK)

## (undated) DEVICE — PENCIL ELECTSURG 10FT BTN SWH - (50/CA)

## (undated) DEVICE — WATER IRRIGATION STERILE 1000ML (12EA/CA)

## (undated) DEVICE — DRILL BIT LOCKING SHORT 4.3X216MM

## (undated) DEVICE — DRILL BIT NON-LOCKING SHORT 3.2X216MM

## (undated) DEVICE — SET LEADWIRE 5 LEAD BEDSIDE DISPOSABLE ECG (1SET OF 5/EA)

## (undated) DEVICE — BOVIE BLADE COATED - (50/PK)

## (undated) DEVICE — GLOVE BIOGEL PI ORTHO SZ 7.5 PF LF (40PR/BX)

## (undated) DEVICE — DRESSING PETROLEUM GAUZE 5 X 9" (50EA/BX 4BX/CA)"

## (undated) DEVICE — SUTURE 0 VICRYL PLUS CTX - 36 INCH (36/BX)